# Patient Record
Sex: FEMALE | Race: BLACK OR AFRICAN AMERICAN | Employment: FULL TIME | URBAN - METROPOLITAN AREA
[De-identification: names, ages, dates, MRNs, and addresses within clinical notes are randomized per-mention and may not be internally consistent; named-entity substitution may affect disease eponyms.]

---

## 2017-05-17 ENCOUNTER — EMPLOYEE WELLNESS (OUTPATIENT)
Dept: OTHER | Age: 52
End: 2017-05-17

## 2017-05-17 LAB
CHOLESTEROL, TOTAL: 234 MG/DL (ref 0–199)
GLUCOSE BLD-MCNC: 91 MG/DL (ref 70–99)
HDLC SERPL-MCNC: 67 MG/DL (ref 40–60)
LDL CHOLESTEROL CALCULATED: 145 MG/DL
TRIGL SERPL-MCNC: 111 MG/DL (ref 0–150)

## 2017-11-01 ENCOUNTER — OFFICE VISIT (OUTPATIENT)
Dept: INTERNAL MEDICINE CLINIC | Age: 52
End: 2017-11-01

## 2017-11-01 VITALS
HEIGHT: 64 IN | HEART RATE: 88 BPM | RESPIRATION RATE: 12 BRPM | BODY MASS INDEX: 33.8 KG/M2 | DIASTOLIC BLOOD PRESSURE: 70 MMHG | WEIGHT: 198 LBS | OXYGEN SATURATION: 97 % | SYSTOLIC BLOOD PRESSURE: 110 MMHG | TEMPERATURE: 98 F

## 2017-11-01 DIAGNOSIS — Z12.11 COLON CANCER SCREENING: ICD-10-CM

## 2017-11-01 DIAGNOSIS — R73.03 PREDIABETES: ICD-10-CM

## 2017-11-01 DIAGNOSIS — Z11.59 NEED FOR HEPATITIS C SCREENING TEST: ICD-10-CM

## 2017-11-01 DIAGNOSIS — Z00.00 ANNUAL PHYSICAL EXAM: ICD-10-CM

## 2017-11-01 DIAGNOSIS — E78.5 HYPERLIPIDEMIA, UNSPECIFIED HYPERLIPIDEMIA TYPE: ICD-10-CM

## 2017-11-01 DIAGNOSIS — Z12.31 ENCOUNTER FOR SCREENING MAMMOGRAM FOR BREAST CANCER: ICD-10-CM

## 2017-11-01 DIAGNOSIS — Z00.00 ANNUAL PHYSICAL EXAM: Primary | ICD-10-CM

## 2017-11-01 LAB
A/G RATIO: 1.8 (ref 1.1–2.2)
ALBUMIN SERPL-MCNC: 4.8 G/DL (ref 3.4–5)
ALP BLD-CCNC: 85 U/L (ref 40–129)
ALT SERPL-CCNC: 23 U/L (ref 10–40)
ANION GAP SERPL CALCULATED.3IONS-SCNC: 14 MMOL/L (ref 3–16)
AST SERPL-CCNC: 21 U/L (ref 15–37)
BASOPHILS ABSOLUTE: 0.1 K/UL (ref 0–0.2)
BASOPHILS RELATIVE PERCENT: 1.4 %
BILIRUB SERPL-MCNC: 0.5 MG/DL (ref 0–1)
BILIRUBIN, POC: NORMAL
BLOOD URINE, POC: NORMAL
BUN BLDV-MCNC: 9 MG/DL (ref 7–20)
CALCIUM SERPL-MCNC: 10.1 MG/DL (ref 8.3–10.6)
CHLORIDE BLD-SCNC: 100 MMOL/L (ref 99–110)
CHOLESTEROL, TOTAL: 253 MG/DL (ref 0–199)
CLARITY, POC: NORMAL
CO2: 26 MMOL/L (ref 21–32)
COLOR, POC: NORMAL
CREAT SERPL-MCNC: 0.8 MG/DL (ref 0.6–1.1)
EOSINOPHILS ABSOLUTE: 0.2 K/UL (ref 0–0.6)
EOSINOPHILS RELATIVE PERCENT: 3.3 %
GFR AFRICAN AMERICAN: >60
GFR NON-AFRICAN AMERICAN: >60
GLOBULIN: 2.6 G/DL
GLUCOSE BLD-MCNC: 95 MG/DL (ref 70–99)
GLUCOSE URINE, POC: NORMAL
HCT VFR BLD CALC: 45.6 % (ref 36–48)
HDLC SERPL-MCNC: 70 MG/DL (ref 40–60)
HEMOGLOBIN: 15.2 G/DL (ref 12–16)
HEPATITIS C ANTIBODY INTERPRETATION: NORMAL
KETONES, POC: NORMAL
LDL CHOLESTEROL CALCULATED: 161 MG/DL
LEUKOCYTE EST, POC: NORMAL
LYMPHOCYTES ABSOLUTE: 1.9 K/UL (ref 1–5.1)
LYMPHOCYTES RELATIVE PERCENT: 39.7 %
MCH RBC QN AUTO: 29.6 PG (ref 26–34)
MCHC RBC AUTO-ENTMCNC: 33.3 G/DL (ref 31–36)
MCV RBC AUTO: 89 FL (ref 80–100)
MONOCYTES ABSOLUTE: 0.4 K/UL (ref 0–1.3)
MONOCYTES RELATIVE PERCENT: 9.2 %
NEUTROPHILS ABSOLUTE: 2.2 K/UL (ref 1.7–7.7)
NEUTROPHILS RELATIVE PERCENT: 46.4 %
NITRITE, POC: NORMAL
PDW BLD-RTO: 13.8 % (ref 12.4–15.4)
PH, POC: 7
PLATELET # BLD: 284 K/UL (ref 135–450)
PMV BLD AUTO: 9.8 FL (ref 5–10.5)
POTASSIUM SERPL-SCNC: 4.7 MMOL/L (ref 3.5–5.1)
PROTEIN, POC: NORMAL
RBC # BLD: 5.13 M/UL (ref 4–5.2)
SODIUM BLD-SCNC: 140 MMOL/L (ref 136–145)
SPECIFIC GRAVITY, POC: 1.02
TOTAL PROTEIN: 7.4 G/DL (ref 6.4–8.2)
TRIGL SERPL-MCNC: 112 MG/DL (ref 0–150)
TSH SERPL DL<=0.05 MIU/L-ACNC: 1.58 UIU/ML (ref 0.27–4.2)
UROBILINOGEN, POC: 0.2
VLDLC SERPL CALC-MCNC: 22 MG/DL
WBC # BLD: 4.8 K/UL (ref 4–11)

## 2017-11-01 PROCEDURE — 81002 URINALYSIS NONAUTO W/O SCOPE: CPT | Performed by: INTERNAL MEDICINE

## 2017-11-01 PROCEDURE — 99396 PREV VISIT EST AGE 40-64: CPT | Performed by: INTERNAL MEDICINE

## 2017-11-01 ASSESSMENT — ENCOUNTER SYMPTOMS
RECTAL PAIN: 0
ABDOMINAL DISTENTION: 0
APNEA: 0
BACK PAIN: 0
BLOOD IN STOOL: 0
SINUS PRESSURE: 0
DIARRHEA: 0
WHEEZING: 0
EYE DISCHARGE: 0
CONSTIPATION: 0
EYE ITCHING: 0
CHOKING: 0
EYE PAIN: 0
PHOTOPHOBIA: 0
RHINORRHEA: 0
FACIAL SWELLING: 0
CHEST TIGHTNESS: 0
SHORTNESS OF BREATH: 0
SORE THROAT: 0
NAUSEA: 0
ABDOMINAL PAIN: 0
EYE REDNESS: 0
TROUBLE SWALLOWING: 0
ANAL BLEEDING: 0
VOICE CHANGE: 0
COUGH: 0
VOMITING: 0

## 2017-11-01 ASSESSMENT — PATIENT HEALTH QUESTIONNAIRE - PHQ9
1. LITTLE INTEREST OR PLEASURE IN DOING THINGS: 1
SUM OF ALL RESPONSES TO PHQ QUESTIONS 1-9: 2
SUM OF ALL RESPONSES TO PHQ9 QUESTIONS 1 & 2: 2
2. FEELING DOWN, DEPRESSED OR HOPELESS: 1

## 2017-11-01 NOTE — PATIENT INSTRUCTIONS
-low carbohydrate diet  -Low fat, low cholesterol diet  -Regular aerobic exercise  -Have a screening mammogram done  -schedule a pap smear with OB/Gyn- Dr. Josh Alvarado, [address] [facility]. Phone: [phone]  -Complete FIT testing for colon cancer screening and return        Patient Education        Well Visit, Women 48 to 72: Care Instructions  Your Care Instructions  Physical exams can help you stay healthy. Your doctor has checked your overall health and may have suggested ways to take good care of yourself. He or she also may have recommended tests. At home, you can help prevent illness with healthy eating, regular exercise, and other steps. Follow-up care is a key part of your treatment and safety. Be sure to make and go to all appointments, and call your doctor if you are having problems. It's also a good idea to know your test results and keep a list of the medicines you take. How can you care for yourself at home? · Reach and stay at a healthy weight. This will lower your risk for many problems, such as obesity, diabetes, heart disease, and high blood pressure. · Get at least 30 minutes of exercise on most days of the week. Walking is a good choice. You also may want to do other activities, such as running, swimming, cycling, or playing tennis or team sports. · Do not smoke. Smoking can make health problems worse. If you need help quitting, talk to your doctor about stop-smoking programs and medicines. These can increase your chances of quitting for good. · Protect your skin from too much sun. When you're outdoors from 10 a.m. to 4 p.m., stay in the shade or cover up with clothing and a hat with a wide brim. Wear sunglasses that block UV rays. Even when it's cloudy, put broad-spectrum sunscreen (SPF 30 or higher) on any exposed skin. · See a dentist one or two times a year for checkups and to have your teeth cleaned. · Wear a seat belt in the car.   · Limit alcohol to 1 drink increase your risk for this disease. You may want to have this test before age 72. · Heart attack and stroke risk. At least every 4 to 6 years, you should have your risk for heart attack and stroke assessed. Your doctor uses factors such as your age, blood pressure, cholesterol, and whether you smoke or have diabetes to show what your risk for a heart attack or stroke is over the next 10 years. When should you call for help? Watch closely for changes in your health, and be sure to contact your doctor if you have any problems or symptoms that concern you. Where can you learn more? Go to [email or web address]://[email or web address].[email or web address]. org and sign in to your [employer] account. Enter Y146 in the [employer] box to learn more about \"Well Visit, Women 50 to 72: Care Instructions. \"     If you do not have an account, please click on the \"Sign Up Now\" link. Current as of: July 19, 2016  Content Version: 11.3  © 2725-9715 [employer]. Care instructions adapted under license by [facility] ([facility]). If you have questions about a medical condition or this instruction, always ask your healthcare professional. [place] [address] any warranty or liability for your use of this information.

## 2017-11-02 PROBLEM — Z00.00 ANNUAL PHYSICAL EXAM: Status: ACTIVE | Noted: 2017-11-02

## 2017-11-02 LAB
ESTIMATED AVERAGE GLUCOSE: 125.5 MG/DL
HBA1C MFR BLD: 6 %

## 2018-01-12 ENCOUNTER — TELEPHONE (OUTPATIENT)
Dept: INTERNAL MEDICINE CLINIC | Age: 53
End: 2018-01-12

## 2018-02-06 ENCOUNTER — OFFICE VISIT (OUTPATIENT)
Dept: INTERNAL MEDICINE CLINIC | Age: 53
End: 2018-02-06

## 2018-02-06 VITALS
DIASTOLIC BLOOD PRESSURE: 90 MMHG | OXYGEN SATURATION: 97 % | SYSTOLIC BLOOD PRESSURE: 138 MMHG | HEIGHT: 64 IN | BODY MASS INDEX: 34.76 KG/M2 | HEART RATE: 68 BPM | WEIGHT: 203.6 LBS

## 2018-02-06 DIAGNOSIS — H53.8 BLURRED VISION, BILATERAL: ICD-10-CM

## 2018-02-06 DIAGNOSIS — R05.9 COUGH: ICD-10-CM

## 2018-02-06 DIAGNOSIS — R73.03 PREDIABETES: ICD-10-CM

## 2018-02-06 DIAGNOSIS — R03.0 ELEVATED BLOOD PRESSURE READING: ICD-10-CM

## 2018-02-06 DIAGNOSIS — R42 DIZZINESS: Primary | ICD-10-CM

## 2018-02-06 LAB
GLUCOSE BLD-MCNC: 171 MG/DL
HBA1C MFR BLD: 5.9 %

## 2018-02-06 PROCEDURE — 82962 GLUCOSE BLOOD TEST: CPT | Performed by: NURSE PRACTITIONER

## 2018-02-06 PROCEDURE — 99214 OFFICE O/P EST MOD 30 MIN: CPT | Performed by: NURSE PRACTITIONER

## 2018-02-06 PROCEDURE — 83036 HEMOGLOBIN GLYCOSYLATED A1C: CPT | Performed by: NURSE PRACTITIONER

## 2018-02-06 ASSESSMENT — PATIENT HEALTH QUESTIONNAIRE - PHQ9
1. LITTLE INTEREST OR PLEASURE IN DOING THINGS: 0
SUM OF ALL RESPONSES TO PHQ QUESTIONS 1-9: 0
2. FEELING DOWN, DEPRESSED OR HOPELESS: 0
SUM OF ALL RESPONSES TO PHQ9 QUESTIONS 1 & 2: 0

## 2018-02-06 ASSESSMENT — ENCOUNTER SYMPTOMS
SINUS PRESSURE: 0
PHOTOPHOBIA: 0
SINUS PAIN: 0

## 2018-02-06 NOTE — PATIENT INSTRUCTIONS
water. Swallowing the water will increase the chance that the medicine will get into your bloodstream. This may make it more likely that you will have side effects. To use a spacer with an inhaler  1. Shake the inhaler. Remove the inhaler cap, and place the mouthpiece of the inhaler into the spacer. Check the inhaler instructions to see if you need to prime your inhaler before you use it. If it needs priming, follow the instructions on how to prime your inhaler. 2. Remove the cap from the spacer. 3. Hold the inhaler upright with the mouthpiece at the bottom. 4. Tilt your head back a little, and breathe out slowly and completely. 5. Place the spacer's mouthpiece in your mouth. 6. Press down on the inhaler to spray one puff of medicine into the spacer, and then start breathing in slowly. Wait to inhale until after you have pressed down on the inhaler. Some spacers have a whistle. If you hear it, you should breathe in more slowly. 7. Hold your breath for 10 seconds. This will let the medicine settle in your lungs. 8. If you need to take a second dose, wait 30 to 60 seconds to allow the inhaler valve to refill. To use an inhaler without a spacer  1. Shake the inhaler as directed. Remove the cap. Check the instructions to see if you need to prime your inhaler before you use it. If it needs priming, follow the instructions on how to prime your inhaler. 2. Hold the inhaler upright with the mouthpiece at the bottom. 3. Tilt your head back a little, and breathe out slowly and completely. 4. Position the inhaler in one of two ways:  ¨ You can place the inhaler in your mouth. This is easier for most people. And it lowers the risk that any of the medicine will get into your eyes. ¨ Or you can place the inhaler 1 to 2 inches in front of your open mouth, without closing your lips over it. Try to open your mouth as wide as you can.  Placing the inhaler in front of your open mouth may be better for getting the medicine

## 2018-02-06 NOTE — PROGRESS NOTES
side.       Patellar reflexes are 2+ on the right side and 2+ on the left side. Achilles reflexes are 2+ on the right side and 2+ on the left side. Skin: Skin is warm and dry. No rash noted. Psychiatric: She has a normal mood and affect. Her behavior is normal. Judgment and thought content normal.   Nursing note and vitals reviewed. Assessment/Plan     Results for POC orders placed in visit on 02/06/18   POCT glycosylated hemoglobin (Hb A1C)   Result Value Ref Range    Hemoglobin A1C 5.9 %   POCT Glucose   Result Value Ref Range    Glucose 171 mg/dL       1. Dizziness  New  Resolved for now. May be related to low blood glucose; elevated blood pressure. Watch your blood pressures. Please remember to keep track of your blood pressure. Ideally, the top number (systolic) should be under 130 and the bottom number (diastolic) should be under 80. If the top is consistently over 140 or the bottom over 90, you should start a blood pressure medication to control it. Watch the salt in your diet -- too much can make blood pressure worse. You should have a doctor or nurse check your blood pressure at least once a year. Try to eat small frequent meals throughout the day to avoid drops in blood sugar. Stop the Delsym as this has sugar in it.    - POCT Glucose  - POCT glycosylated hemoglobin (Hb A1C)    2. Blurred vision, bilateral  Please have your eyes checked and make sure to ask them to check your eye pressures. 3. Prediabetes  Stable    4. Cough  May be bronchial.  Albuterol inhaler when you are coughing or feeling tight in the chest.  Try OTC antihistamine such as Claritin, Allegra or Zyrtec. Trial one of the following nasal steroids:      Nasacort AQ or Flonase 2 sprays each side of the nose once daily. Both are available over the counter. Consider CXR if sxs persist.     5. Elevated blood pressure reading  Up today. Watch this as noted above.       Follow up in 4 to 6 weeks of your symptoms return. Otherwise, follow up as needed. Discussed medications with patient, who voiced understanding of their use and indications. All questions answered. Pt is advised to call if symptoms worsen or do not improve.

## 2018-02-07 ASSESSMENT — ENCOUNTER SYMPTOMS
WHEEZING: 1
CHEST TIGHTNESS: 0
SHORTNESS OF BREATH: 0
GASTROINTESTINAL NEGATIVE: 1
COUGH: 1

## 2018-03-20 VITALS — WEIGHT: 199 LBS | BODY MASS INDEX: 35.25 KG/M2

## 2018-04-12 PROBLEM — Z00.00 ANNUAL PHYSICAL EXAM: Status: RESOLVED | Noted: 2017-11-02 | Resolved: 2018-04-12

## 2018-05-10 ENCOUNTER — EMPLOYEE WELLNESS (OUTPATIENT)
Dept: OTHER | Age: 53
End: 2018-05-10

## 2018-05-21 VITALS — WEIGHT: 203 LBS | BODY MASS INDEX: 34.84 KG/M2

## 2018-07-23 ENCOUNTER — HOSPITAL ENCOUNTER (OUTPATIENT)
Dept: MAMMOGRAPHY | Age: 53
Discharge: HOME | End: 2018-07-23
Attending: INTERNAL MEDICINE | Admitting: INTERNAL MEDICINE

## 2018-07-23 DIAGNOSIS — Z12.31 ENCOUNTER FOR SCREENING MAMMOGRAM FOR BREAST CANCER: ICD-10-CM

## 2019-07-01 ENCOUNTER — OFFICE VISIT (OUTPATIENT)
Dept: INTERNAL MEDICINE CLINIC | Age: 54
End: 2019-07-01
Payer: COMMERCIAL

## 2019-07-01 VITALS
TEMPERATURE: 98.4 F | HEIGHT: 64 IN | DIASTOLIC BLOOD PRESSURE: 82 MMHG | WEIGHT: 208 LBS | HEART RATE: 75 BPM | SYSTOLIC BLOOD PRESSURE: 134 MMHG | OXYGEN SATURATION: 99 % | BODY MASS INDEX: 35.51 KG/M2 | RESPIRATION RATE: 14 BRPM

## 2019-07-01 DIAGNOSIS — R20.0 BILATERAL HAND NUMBNESS: ICD-10-CM

## 2019-07-01 DIAGNOSIS — R73.03 PREDIABETES: ICD-10-CM

## 2019-07-01 DIAGNOSIS — M79.641 BILATERAL HAND PAIN: ICD-10-CM

## 2019-07-01 DIAGNOSIS — L29.9 ITCHING: ICD-10-CM

## 2019-07-01 DIAGNOSIS — Z12.31 ENCOUNTER FOR SCREENING MAMMOGRAM FOR BREAST CANCER: ICD-10-CM

## 2019-07-01 DIAGNOSIS — Z00.00 ANNUAL PHYSICAL EXAM: Primary | ICD-10-CM

## 2019-07-01 DIAGNOSIS — E78.2 MIXED HYPERLIPIDEMIA: ICD-10-CM

## 2019-07-01 DIAGNOSIS — Z12.4 PAP SMEAR FOR CERVICAL CANCER SCREENING: ICD-10-CM

## 2019-07-01 DIAGNOSIS — M79.642 BILATERAL HAND PAIN: ICD-10-CM

## 2019-07-01 DIAGNOSIS — Z00.00 ANNUAL PHYSICAL EXAM: ICD-10-CM

## 2019-07-01 LAB
A/G RATIO: 1.8 (ref 1.1–2.2)
ALBUMIN SERPL-MCNC: 4.7 G/DL (ref 3.4–5)
ALP BLD-CCNC: 85 U/L (ref 40–129)
ALT SERPL-CCNC: 25 U/L (ref 10–40)
ANION GAP SERPL CALCULATED.3IONS-SCNC: 16 MMOL/L (ref 3–16)
AST SERPL-CCNC: 20 U/L (ref 15–37)
BASOPHILS ABSOLUTE: 0 K/UL (ref 0–0.2)
BASOPHILS RELATIVE PERCENT: 0.9 %
BILIRUB SERPL-MCNC: 0.4 MG/DL (ref 0–1)
BUN BLDV-MCNC: 10 MG/DL (ref 7–20)
CALCIUM SERPL-MCNC: 10.2 MG/DL (ref 8.3–10.6)
CHLORIDE BLD-SCNC: 103 MMOL/L (ref 99–110)
CHOLESTEROL, TOTAL: 246 MG/DL (ref 0–199)
CO2: 24 MMOL/L (ref 21–32)
CREAT SERPL-MCNC: 1.1 MG/DL (ref 0.6–1.1)
EOSINOPHILS ABSOLUTE: 0.7 K/UL (ref 0–0.6)
EOSINOPHILS RELATIVE PERCENT: 14.8 %
GFR AFRICAN AMERICAN: >60
GFR NON-AFRICAN AMERICAN: 51
GLOBULIN: 2.6 G/DL
GLUCOSE BLD-MCNC: 110 MG/DL (ref 70–99)
HCT VFR BLD CALC: 43 % (ref 36–48)
HDLC SERPL-MCNC: 65 MG/DL (ref 40–60)
HEMOGLOBIN: 14.2 G/DL (ref 12–16)
LDL CHOLESTEROL CALCULATED: 161 MG/DL
LYMPHOCYTES ABSOLUTE: 1.9 K/UL (ref 1–5.1)
LYMPHOCYTES RELATIVE PERCENT: 40.6 %
MCH RBC QN AUTO: 29.1 PG (ref 26–34)
MCHC RBC AUTO-ENTMCNC: 33.1 G/DL (ref 31–36)
MCV RBC AUTO: 87.7 FL (ref 80–100)
MONOCYTES ABSOLUTE: 0.4 K/UL (ref 0–1.3)
MONOCYTES RELATIVE PERCENT: 8.3 %
NEUTROPHILS ABSOLUTE: 1.6 K/UL (ref 1.7–7.7)
NEUTROPHILS RELATIVE PERCENT: 35.4 %
PDW BLD-RTO: 13.7 % (ref 12.4–15.4)
PLATELET # BLD: 256 K/UL (ref 135–450)
PMV BLD AUTO: 9.5 FL (ref 5–10.5)
POTASSIUM SERPL-SCNC: 4.7 MMOL/L (ref 3.5–5.1)
RBC # BLD: 4.9 M/UL (ref 4–5.2)
SODIUM BLD-SCNC: 143 MMOL/L (ref 136–145)
TOTAL PROTEIN: 7.3 G/DL (ref 6.4–8.2)
TRIGL SERPL-MCNC: 99 MG/DL (ref 0–150)
TSH SERPL DL<=0.05 MIU/L-ACNC: 1.4 UIU/ML (ref 0.27–4.2)
VLDLC SERPL CALC-MCNC: 20 MG/DL
WBC # BLD: 4.6 K/UL (ref 4–11)

## 2019-07-01 PROCEDURE — 99396 PREV VISIT EST AGE 40-64: CPT | Performed by: INTERNAL MEDICINE

## 2019-07-01 ASSESSMENT — ENCOUNTER SYMPTOMS
VOICE CHANGE: 0
EYE ITCHING: 0
ABDOMINAL DISTENTION: 0
ABDOMINAL PAIN: 0
SORE THROAT: 0
BLOOD IN STOOL: 0
CHOKING: 0
APNEA: 0
EYE DISCHARGE: 0
RHINORRHEA: 0
PHOTOPHOBIA: 0
VOMITING: 0
CHEST TIGHTNESS: 0
EYE PAIN: 0
CONSTIPATION: 0
WHEEZING: 0
RECTAL PAIN: 0
NAUSEA: 0
TROUBLE SWALLOWING: 0
SINUS PRESSURE: 0
ANAL BLEEDING: 0
EYE REDNESS: 0
COUGH: 0
BACK PAIN: 0
DIARRHEA: 0
SHORTNESS OF BREATH: 0
FACIAL SWELLING: 0

## 2019-07-01 ASSESSMENT — PATIENT HEALTH QUESTIONNAIRE - PHQ9
1. LITTLE INTEREST OR PLEASURE IN DOING THINGS: 0
SUM OF ALL RESPONSES TO PHQ QUESTIONS 1-9: 0
SUM OF ALL RESPONSES TO PHQ QUESTIONS 1-9: 0
2. FEELING DOWN, DEPRESSED OR HOPELESS: 0
SUM OF ALL RESPONSES TO PHQ9 QUESTIONS 1 & 2: 0

## 2019-07-01 NOTE — PROGRESS NOTES
History   Administered Date(s) Administered    Influenza Vaccine, unspecified formulation 10/06/2017    Influenza Virus Vaccine 11/11/2015    Tdap (Boostrix, Adacel) 06/11/2010, 07/23/2013       Vitals:    07/01/19 1304   BP: 134/82   Site: Right Upper Arm   Position: Sitting   Cuff Size: Medium Adult   Pulse: 75   Resp: 14   Temp: 98.4 °F (36.9 °C)   TempSrc: Oral   SpO2: 99%   Weight: 208 lb (94.3 kg)   Height: 5' 4\" (1.626 m)     Body mass index is 35.7 kg/m². Physical Exam   Constitutional: She is oriented to person, place, and time. She appears well-developed and well-nourished. No distress. HENT:   Head: Normocephalic and atraumatic. Right Ear: Hearing, tympanic membrane and ear canal normal.   Left Ear: Hearing, tympanic membrane and ear canal normal.   Nose: Nose normal.   Mouth/Throat: Uvula is midline, oropharynx is clear and moist and mucous membranes are normal.   Eyes: Pupils are equal, round, and reactive to light. Conjunctivae, EOM and lids are normal.   Neck: Neck supple. Carotid bruit is not present. No thyromegaly present. Cardiovascular: Normal rate, regular rhythm, S1 normal, S2 normal, normal heart sounds and intact distal pulses. Exam reveals no gallop and no friction rub. No murmur heard. Pulmonary/Chest: Effort normal and breath sounds normal. No respiratory distress. She has no wheezes. She has no rhonchi. She has no rales. Right breast exhibits no inverted nipple, no mass, no nipple discharge, no skin change and no tenderness. Left breast exhibits no inverted nipple, no mass, no nipple discharge, no skin change and no tenderness. Breasts are symmetrical.   Abdominal: Soft. Bowel sounds are normal. She exhibits no distension and no mass. There is no hepatosplenomegaly. There is no tenderness. There is no rebound. Genitourinary: Vagina normal and uterus normal. There is no lesion on the right labia. There is no lesion on the left labia.  Cervix exhibits no motion tenderness,

## 2019-07-01 NOTE — PATIENT INSTRUCTIONS
1. Annual physical exam  - Comprehensive Metabolic Panel; Future  - CBC Auto Differential; Future  - Lipid Panel; Future  - TSH without Reflex; Future  - Patient given order for screening mammogram due 7/23/19  - pap smear done today  - Tdap done on 7/23/13  -Regular aerobic exercise    2. Prediabetes  -Low carbohydrate diet  -Regular aerobic exercise  - Hemoglobin A1C; Future    3. Mixed hyperlipidemia  -Low fat, low cholesterol diet  -Regular aerobic exercise    4. Bilateral hand numbness  - EMG; Future  - wrist supports nightly    5. Bilateral hand pain  - XR HAND LEFT (MIN 3 VIEWS); Future  - XR HAND RIGHT (MIN 3 VIEWS); Future  - Ibuprofen as needed    6. Itching  - hydrocortisone 2.5 % cream; Apply topically 2 times daily. Dispense: 30 g; Refill: 0    7. Encounter for screening mammogram for breast cancer  - CHLOE DIGITAL SCREEN W OR WO CAD BILATERAL; Future        Patient Education        Well Visit, Women 48 to 72: Care Instructions  Your Care Instructions    Physical exams can help you stay healthy. Your doctor has checked your overall health and may have suggested ways to take good care of yourself. He or she also may have recommended tests. At home, you can help prevent illness with healthy eating, regular exercise, and other steps. Follow-up care is a key part of your treatment and safety. Be sure to make and go to all appointments, and call your doctor if you are having problems. It's also a good idea to know your test results and keep a list of the medicines you take. How can you care for yourself at home? · Reach and stay at a healthy weight. This will lower your risk for many problems, such as obesity, diabetes, heart disease, and high blood pressure. · Get at least 30 minutes of exercise on most days of the week. Walking is a good choice. You also may want to do other activities, such as running, swimming, cycling, or playing tennis or team sports. · Do not smoke.  Smoking can make health

## 2019-07-02 LAB
ESTIMATED AVERAGE GLUCOSE: 128.4 MG/DL
HBA1C MFR BLD: 6.1 %

## 2019-07-08 ASSESSMENT — ENCOUNTER SYMPTOMS: ROS SKIN COMMENTS: ITCHING

## 2019-07-12 ENCOUNTER — TELEPHONE (OUTPATIENT)
Dept: INTERNAL MEDICINE CLINIC | Age: 54
End: 2019-07-12

## 2019-07-27 ENCOUNTER — HOSPITAL ENCOUNTER (OUTPATIENT)
Dept: WOMENS IMAGING | Age: 54
Discharge: HOME | End: 2019-07-27
Payer: COMMERCIAL

## 2019-07-27 ENCOUNTER — HOSPITAL ENCOUNTER (OUTPATIENT)
Dept: GENERAL RADIOLOGY | Age: 54
Discharge: HOME | End: 2019-07-27
Payer: COMMERCIAL

## 2019-07-27 ENCOUNTER — HOSPITAL ENCOUNTER (OUTPATIENT)
Age: 54
Discharge: HOME | End: 2019-07-27
Payer: COMMERCIAL

## 2019-07-27 DIAGNOSIS — M79.642 BILATERAL HAND PAIN: ICD-10-CM

## 2019-07-27 DIAGNOSIS — Z12.31 ENCOUNTER FOR SCREENING MAMMOGRAM FOR BREAST CANCER: ICD-10-CM

## 2019-07-27 DIAGNOSIS — M79.641 BILATERAL HAND PAIN: ICD-10-CM

## 2019-07-27 PROCEDURE — 73130 X-RAY EXAM OF HAND: CPT

## 2019-07-27 PROCEDURE — 77067 SCR MAMMO BI INCL CAD: CPT

## 2019-08-01 ENCOUNTER — OFFICE VISIT (OUTPATIENT)
Dept: INTERNAL MEDICINE CLINIC | Age: 54
End: 2019-08-01
Payer: COMMERCIAL

## 2019-08-01 VITALS
DIASTOLIC BLOOD PRESSURE: 88 MMHG | WEIGHT: 206.4 LBS | BODY MASS INDEX: 35.24 KG/M2 | HEIGHT: 64 IN | HEART RATE: 83 BPM | OXYGEN SATURATION: 95 % | SYSTOLIC BLOOD PRESSURE: 120 MMHG

## 2019-08-01 DIAGNOSIS — R87.618 UNEXPLAINED ENDOMETRIAL CELLS ON CERVICAL PAP SMEAR: ICD-10-CM

## 2019-08-01 DIAGNOSIS — M79.642 BILATERAL HAND PAIN: ICD-10-CM

## 2019-08-01 DIAGNOSIS — D70.9 NEUTROPENIA, UNSPECIFIED TYPE (HCC): ICD-10-CM

## 2019-08-01 DIAGNOSIS — M19.042 PRIMARY OSTEOARTHRITIS OF LEFT HAND: Primary | ICD-10-CM

## 2019-08-01 DIAGNOSIS — M79.641 BILATERAL HAND PAIN: ICD-10-CM

## 2019-08-01 DIAGNOSIS — L29.9 ITCHING: ICD-10-CM

## 2019-08-01 PROCEDURE — 99214 OFFICE O/P EST MOD 30 MIN: CPT | Performed by: INTERNAL MEDICINE

## 2019-08-01 NOTE — PATIENT INSTRUCTIONS
-Ibuprofen 600mg 3 times daily as needed  -Have EMG done  -Referral to Gynecology for unexplained endometrial cells on pap smear  -low carbohydrate diet  -Low fat, low cholesterol diet  -Regular aerobic exercise

## 2019-08-07 ASSESSMENT — ENCOUNTER SYMPTOMS
COUGH: 0
CHEST TIGHTNESS: 0
SINUS PRESSURE: 0
CONSTIPATION: 0
RHINORRHEA: 0
DIARRHEA: 0
SORE THROAT: 0
VOMITING: 0
BLOOD IN STOOL: 0
NAUSEA: 0
SHORTNESS OF BREATH: 0
WHEEZING: 0
ABDOMINAL PAIN: 0

## 2019-08-20 ENCOUNTER — HOSPITAL ENCOUNTER (OUTPATIENT)
Dept: NEUROLOGY | Age: 54
Discharge: HOME | End: 2019-08-20
Payer: COMMERCIAL

## 2019-08-20 DIAGNOSIS — R20.0 BILATERAL HAND NUMBNESS: ICD-10-CM

## 2019-08-20 PROCEDURE — 95886 MUSC TEST DONE W/N TEST COMP: CPT

## 2019-08-20 PROCEDURE — 95911 NRV CNDJ TEST 9-10 STUDIES: CPT | Performed by: PSYCHIATRY & NEUROLOGY

## 2019-08-20 PROCEDURE — 95911 NRV CNDJ TEST 9-10 STUDIES: CPT

## 2019-08-20 PROCEDURE — 95886 MUSC TEST DONE W/N TEST COMP: CPT | Performed by: PSYCHIATRY & NEUROLOGY

## 2019-09-06 DIAGNOSIS — G56.03 CARPAL TUNNEL SYNDROME, BILATERAL: Primary | ICD-10-CM

## 2019-10-09 ENCOUNTER — OFFICE VISIT (OUTPATIENT)
Dept: ORTHOPEDIC SURGERY | Age: 54
End: 2019-10-09
Payer: COMMERCIAL

## 2019-10-09 VITALS
DIASTOLIC BLOOD PRESSURE: 83 MMHG | WEIGHT: 190 LBS | HEART RATE: 68 BPM | RESPIRATION RATE: 8 BRPM | HEIGHT: 62 IN | BODY MASS INDEX: 34.96 KG/M2 | SYSTOLIC BLOOD PRESSURE: 134 MMHG

## 2019-10-09 DIAGNOSIS — G56.03 BILATERAL CARPAL TUNNEL SYNDROME: Primary | ICD-10-CM

## 2019-10-09 DIAGNOSIS — R20.0 BILATERAL HAND NUMBNESS: ICD-10-CM

## 2019-10-09 PROCEDURE — 99203 OFFICE O/P NEW LOW 30 MIN: CPT | Performed by: PHYSICIAN ASSISTANT

## 2019-10-09 PROCEDURE — L3908 WHO COCK-UP NONMOLDE PRE OTS: HCPCS | Performed by: PHYSICIAN ASSISTANT

## 2019-10-19 ENCOUNTER — APPOINTMENT (OUTPATIENT)
Dept: GENERAL RADIOLOGY | Age: 54
End: 2019-10-19
Payer: COMMERCIAL

## 2019-10-19 ENCOUNTER — HOSPITAL ENCOUNTER (EMERGENCY)
Age: 54
Discharge: HOME | End: 2019-10-19
Payer: COMMERCIAL

## 2019-10-19 VITALS
RESPIRATION RATE: 16 BRPM | TEMPERATURE: 97.6 F | OXYGEN SATURATION: 98 % | HEART RATE: 68 BPM | BODY MASS INDEX: 34.96 KG/M2 | SYSTOLIC BLOOD PRESSURE: 146 MMHG | WEIGHT: 190 LBS | DIASTOLIC BLOOD PRESSURE: 92 MMHG | HEIGHT: 62 IN

## 2019-10-19 DIAGNOSIS — S63.92XA HAND SPRAIN, LEFT, INITIAL ENCOUNTER: Primary | ICD-10-CM

## 2019-10-19 PROCEDURE — 73130 X-RAY EXAM OF HAND: CPT

## 2019-10-19 PROCEDURE — 99283 EMERGENCY DEPT VISIT LOW MDM: CPT

## 2019-10-19 PROCEDURE — 73110 X-RAY EXAM OF WRIST: CPT

## 2019-10-19 RX ORDER — NAPROXEN 500 MG/1
500 TABLET ORAL 2 TIMES DAILY
Qty: 20 TABLET | Refills: 0 | Status: SHIPPED | OUTPATIENT
Start: 2019-10-19 | End: 2019-10-29

## 2019-10-19 ASSESSMENT — ENCOUNTER SYMPTOMS
COLOR CHANGE: 0
SHORTNESS OF BREATH: 0
COUGH: 0
STRIDOR: 0
BACK PAIN: 0
WHEEZING: 0

## 2019-10-19 ASSESSMENT — PAIN DESCRIPTION - PAIN TYPE: TYPE: ACUTE PAIN

## 2019-10-19 ASSESSMENT — PAIN DESCRIPTION - ORIENTATION: ORIENTATION: LEFT

## 2019-10-19 ASSESSMENT — PAIN DESCRIPTION - LOCATION: LOCATION: HAND;WRIST

## 2019-10-19 ASSESSMENT — PAIN SCALES - GENERAL: PAINLEVEL_OUTOF10: 7

## 2019-10-22 ENCOUNTER — OFFICE VISIT (OUTPATIENT)
Dept: PRIMARY CARE CLINIC | Age: 54
End: 2019-10-22
Payer: COMMERCIAL

## 2019-10-22 VITALS
HEART RATE: 78 BPM | SYSTOLIC BLOOD PRESSURE: 120 MMHG | BODY MASS INDEX: 36.18 KG/M2 | RESPIRATION RATE: 16 BRPM | TEMPERATURE: 98.2 F | WEIGHT: 196.6 LBS | HEIGHT: 62 IN | DIASTOLIC BLOOD PRESSURE: 82 MMHG | OXYGEN SATURATION: 98 %

## 2019-10-22 DIAGNOSIS — S63.92XA SPRAIN OF LEFT HAND, INITIAL ENCOUNTER: ICD-10-CM

## 2019-10-22 DIAGNOSIS — G56.03 BILATERAL CARPAL TUNNEL SYNDROME: Primary | ICD-10-CM

## 2019-10-22 PROCEDURE — 99213 OFFICE O/P EST LOW 20 MIN: CPT | Performed by: INTERNAL MEDICINE

## 2019-10-22 ASSESSMENT — PATIENT HEALTH QUESTIONNAIRE - PHQ9
SUM OF ALL RESPONSES TO PHQ QUESTIONS 1-9: 0
2. FEELING DOWN, DEPRESSED OR HOPELESS: 0
SUM OF ALL RESPONSES TO PHQ9 QUESTIONS 1 & 2: 0
1. LITTLE INTEREST OR PLEASURE IN DOING THINGS: 0
SUM OF ALL RESPONSES TO PHQ QUESTIONS 1-9: 0

## 2019-10-29 ENCOUNTER — TELEPHONE (OUTPATIENT)
Dept: ORTHOPEDIC SURGERY | Age: 54
End: 2019-10-29

## 2020-02-03 ENCOUNTER — OFFICE VISIT (OUTPATIENT)
Dept: PRIMARY CARE CLINIC | Age: 55
End: 2020-02-03
Payer: COMMERCIAL

## 2020-02-03 VITALS
HEART RATE: 82 BPM | OXYGEN SATURATION: 96 % | HEIGHT: 62 IN | SYSTOLIC BLOOD PRESSURE: 132 MMHG | BODY MASS INDEX: 34.34 KG/M2 | DIASTOLIC BLOOD PRESSURE: 80 MMHG | WEIGHT: 186.6 LBS

## 2020-02-03 DIAGNOSIS — R73.03 PREDIABETES: ICD-10-CM

## 2020-02-03 DIAGNOSIS — E78.2 MIXED HYPERLIPIDEMIA: ICD-10-CM

## 2020-02-03 DIAGNOSIS — D70.9 NEUTROPENIA, UNSPECIFIED TYPE (HCC): ICD-10-CM

## 2020-02-03 LAB
BASOPHILS ABSOLUTE: 0.1 K/UL (ref 0–0.2)
BASOPHILS RELATIVE PERCENT: 1.7 %
EOSINOPHILS ABSOLUTE: 0.1 K/UL (ref 0–0.6)
EOSINOPHILS RELATIVE PERCENT: 3 %
HCT VFR BLD CALC: 44.3 % (ref 36–48)
HEMOGLOBIN: 14.6 G/DL (ref 12–16)
LYMPHOCYTES ABSOLUTE: 2 K/UL (ref 1–5.1)
LYMPHOCYTES RELATIVE PERCENT: 46 %
MCH RBC QN AUTO: 29.3 PG (ref 26–34)
MCHC RBC AUTO-ENTMCNC: 32.9 G/DL (ref 31–36)
MCV RBC AUTO: 88.9 FL (ref 80–100)
MONOCYTES ABSOLUTE: 0.6 K/UL (ref 0–1.3)
MONOCYTES RELATIVE PERCENT: 12.9 %
NEUTROPHILS ABSOLUTE: 1.6 K/UL (ref 1.7–7.7)
NEUTROPHILS RELATIVE PERCENT: 36.4 %
PDW BLD-RTO: 13.3 % (ref 12.4–15.4)
PLATELET # BLD: 242 K/UL (ref 135–450)
PMV BLD AUTO: 10.1 FL (ref 5–10.5)
RBC # BLD: 4.99 M/UL (ref 4–5.2)
WBC # BLD: 4.4 K/UL (ref 4–11)

## 2020-02-03 PROCEDURE — 99214 OFFICE O/P EST MOD 30 MIN: CPT | Performed by: INTERNAL MEDICINE

## 2020-02-03 SDOH — ECONOMIC STABILITY: FOOD INSECURITY: WITHIN THE PAST 12 MONTHS, THE FOOD YOU BOUGHT JUST DIDN'T LAST AND YOU DIDN'T HAVE MONEY TO GET MORE.: NEVER TRUE

## 2020-02-03 SDOH — ECONOMIC STABILITY: INCOME INSECURITY: HOW HARD IS IT FOR YOU TO PAY FOR THE VERY BASICS LIKE FOOD, HOUSING, MEDICAL CARE, AND HEATING?: NOT VERY HARD

## 2020-02-03 SDOH — ECONOMIC STABILITY: TRANSPORTATION INSECURITY
IN THE PAST 12 MONTHS, HAS THE LACK OF TRANSPORTATION KEPT YOU FROM MEDICAL APPOINTMENTS OR FROM GETTING MEDICATIONS?: NO

## 2020-02-03 SDOH — ECONOMIC STABILITY: FOOD INSECURITY: WITHIN THE PAST 12 MONTHS, YOU WORRIED THAT YOUR FOOD WOULD RUN OUT BEFORE YOU GOT MONEY TO BUY MORE.: NEVER TRUE

## 2020-02-03 SDOH — ECONOMIC STABILITY: TRANSPORTATION INSECURITY
IN THE PAST 12 MONTHS, HAS LACK OF TRANSPORTATION KEPT YOU FROM MEETINGS, WORK, OR FROM GETTING THINGS NEEDED FOR DAILY LIVING?: NO

## 2020-02-03 ASSESSMENT — PATIENT HEALTH QUESTIONNAIRE - PHQ9
SUM OF ALL RESPONSES TO PHQ9 QUESTIONS 1 & 2: 0
2. FEELING DOWN, DEPRESSED OR HOPELESS: 0
SUM OF ALL RESPONSES TO PHQ QUESTIONS 1-9: 0
SUM OF ALL RESPONSES TO PHQ QUESTIONS 1-9: 0
1. LITTLE INTEREST OR PLEASURE IN DOING THINGS: 0

## 2020-02-03 ASSESSMENT — ENCOUNTER SYMPTOMS
RHINORRHEA: 0
SINUS PRESSURE: 0
NAUSEA: 0
CHEST TIGHTNESS: 0
WHEEZING: 0
BLOOD IN STOOL: 0
VOMITING: 0
ABDOMINAL PAIN: 0
SORE THROAT: 0
CONSTIPATION: 0
SHORTNESS OF BREATH: 0
COUGH: 0
DIARRHEA: 0

## 2020-02-03 NOTE — PATIENT INSTRUCTIONS
1. Mixed hyperlipidemia  - patient is not on medications  - patient has weight loss of 20 lbs  -Low fat, low cholesterol diet  -Regular aerobic exercise  - Lipid Panel; Future    2. Prediabetes  -Low carbohydrate diet  -Pt states she has been exercising 5 days per week. Pt does treadmill for 30-40 minutes and weights.  -Regular aerobic exercise  -Hemoglobin A1C; Future    3.  Primary osteoarthritis of left hand  -stable  -Continue same medications

## 2020-02-03 NOTE — PROGRESS NOTES
Gianna Briscoe   Date ofBirth:  1965    Date of Visit:  2/3/2020    Chief Complaint   Patient presents with    Hyperlipidemia    Arthritis     hand    Other     Prediabetes       HPI  Hyperlipidemia-Pt is not on cholesterol medication. Pt states she has been decreasing fat and cholesterol. Pt states she has been exercising 5 days per week. Pt does treadmill for 30-40 minutes and weights. Pt is fasting today for labs. Prediabetes- Pt decreases carbohydrates. Pt states she has been exercising 5 days per week. Pt does treadmill for 30-40 minutes and weights. Pt states she has lost close to 20 lbs since July. Primary osteoarthritis left hand- Pt states her hand pain is daily but not all day. Hand pain is dull. Pt takes Ibuprofen sometimes. Wt Readings from Last 3 Encounters:   02/03/20 186 lb 9.6 oz (84.6 kg)   10/22/19 196 lb 9.6 oz (89.2 kg)   10/19/19 190 lb (86.2 kg)          Review of Systems   Constitutional: Negative for chills, fatigue and fever. HENT: Negative for congestion, postnasal drip, rhinorrhea, sinus pressure and sore throat. Eyes: Negative for visual disturbance. Respiratory: Negative for cough, chest tightness, shortness of breath and wheezing. Cardiovascular: Negative for chest pain, palpitations and leg swelling. Gastrointestinal: Negative for abdominal pain, blood in stool, constipation, diarrhea, nausea and vomiting. Genitourinary: Negative for dysuria, frequency and hematuria. Musculoskeletal: Positive for arthralgias. Negative for joint swelling and myalgias. Skin: Negative for rash. Neurological: Positive for numbness (in hands due to carpal tunnel). Negative for dizziness, tremors, syncope, weakness, light-headedness and headaches. Psychiatric/Behavioral: Negative for dysphoric mood and sleep disturbance. The patient is nervous/anxious (on sunday nights when she has to go back to work on Mondays).         Allergies   Allergen Reactions    Morphine Palpations: Abdomen is soft. Tenderness: There is no abdominal tenderness. Musculoskeletal:      Left hand: She exhibits no tenderness and no swelling. Right lower leg: No edema. Left lower leg: No edema. Lymphadenopathy:      Head:      Right side of head: No submandibular adenopathy. Left side of head: No submandibular adenopathy. Neurological:      Mental Status: She is alert and oriented to person, place, and time. No results found for this visit on 02/03/20. Lab Review   No visits with results within 6 Month(s) from this visit.    Latest known visit with results is:   Orders Only on 07/01/2019   Component Date Value    Hemoglobin A1C 07/01/2019 6.1     eAG 07/01/2019 128.4     TSH 07/01/2019 1.40     Cholesterol, Total 07/01/2019 246*    Triglycerides 07/01/2019 99     HDL 07/01/2019 65*    LDL Calculated 07/01/2019 161*    VLDL Cholesterol Calcula* 07/01/2019 20     WBC 07/01/2019 4.6     RBC 07/01/2019 4.90     Hemoglobin 07/01/2019 14.2     Hematocrit 07/01/2019 43.0     MCV 07/01/2019 87.7     MCH 07/01/2019 29.1     MCHC 07/01/2019 33.1     RDW 07/01/2019 13.7     Platelets 69/41/6988 256     MPV 07/01/2019 9.5     Neutrophils % 07/01/2019 35.4     Lymphocytes % 07/01/2019 40.6     Monocytes % 07/01/2019 8.3     Eosinophils % 07/01/2019 14.8     Basophils % 07/01/2019 0.9     Neutrophils Absolute 07/01/2019 1.6*    Lymphocytes Absolute 07/01/2019 1.9     Monocytes Absolute 07/01/2019 0.4     Eosinophils Absolute 07/01/2019 0.7*    Basophils Absolute 07/01/2019 0.0     Sodium 07/01/2019 143     Potassium 07/01/2019 4.7     Chloride 07/01/2019 103     CO2 07/01/2019 24     Anion Gap 07/01/2019 16     Glucose 07/01/2019 110*    BUN 07/01/2019 10     CREATININE 07/01/2019 1.1     GFR Non- 07/01/2019 51*    GFR  07/01/2019 >60     Calcium 07/01/2019 10.2     Total Protein 07/01/2019 7.3     Alb

## 2020-02-04 LAB
CHOLESTEROL, TOTAL: 239 MG/DL (ref 0–199)
ESTIMATED AVERAGE GLUCOSE: 111.2 MG/DL
HBA1C MFR BLD: 5.5 %
HDLC SERPL-MCNC: 62 MG/DL (ref 40–60)
LDL CHOLESTEROL CALCULATED: 161 MG/DL
TRIGL SERPL-MCNC: 78 MG/DL (ref 0–150)
VLDLC SERPL CALC-MCNC: 16 MG/DL

## 2020-04-28 NOTE — PROGRESS NOTES
The [employer] would not go through, as only an MD can order.    Please sign.    Thank you~   Constitutional: She is oriented to person, place, and time. She appears well-developed and well-nourished. No distress. Obese middle aged BF   HENT:   Head: Normocephalic and atraumatic. Right Ear: Hearing, tympanic membrane and ear canal normal.   Left Ear: Hearing, tympanic membrane and ear canal normal.   Nose: Nose normal.   Mouth/Throat: Uvula is midline, oropharynx is clear and moist and mucous membranes are normal.   Eyes: Conjunctivae, EOM and lids are normal. Pupils are equal, round, and reactive to light. Neck: Neck supple. Carotid bruit is not present. No thyromegaly present. Cardiovascular: Normal rate, regular rhythm, S1 normal, S2 normal, normal heart sounds and intact distal pulses. Exam reveals no gallop and no friction rub. No murmur heard. Pulmonary/Chest: Effort normal and breath sounds normal. No respiratory distress. She has no wheezes. She has no rhonchi. She has no rales. Right breast exhibits no inverted nipple, no mass, no nipple discharge, no skin change and no tenderness. Left breast exhibits no inverted nipple, no mass, no nipple discharge, no skin change and no tenderness. Breasts are symmetrical.   Abdominal: Soft. Bowel sounds are normal. She exhibits no distension and no mass. There is no hepatosplenomegaly. There is no tenderness. There is no rebound. Genitourinary:   Genitourinary Comments: Deferred   Musculoskeletal: Normal range of motion. She exhibits no edema. Right shoulder: She exhibits normal range of motion and no tenderness. Left shoulder: She exhibits normal range of motion and no tenderness. Right elbow: She exhibits no swelling. No tenderness found. Left elbow: She exhibits no swelling. No tenderness found. Right wrist: She exhibits no tenderness and no swelling. Left wrist: She exhibits no tenderness and no swelling. Right hip: She exhibits no tenderness. Left hip: She exhibits no tenderness. UA neg        Assessment/Plan     1. Annual physical exam  - POCT Urinalysis no Micro  - Comprehensive Metabolic Panel; Future  - CBC Auto Differential; Future  - Lipid Panel; Future fasting  - TSH without Reflex; Future  - Patient given order for screening mammogram  - Patient given a referral information to a Gynecologist for annual Gyn exam and pap smear  -Patient declines a colonoscopy  -Complete FIT testing for colon cancer screening and return  -Tdap done on 7/23/13  - Flu shot done on 10/6/17  -Regular aerobic exercise    2. Prediabetes  - Hemoglobin A1C; Future  -low carbohydrate diet  -Regular aerobic exercise    3. Hyperlipidemia, unspecified hyperlipidemia type  -Low fat, low cholesterol diet  -Regular aerobic exercise  - Lipid Panel; Future fasting    4. Need for hepatitis C screening test  - Hepatitis C Antibody; Future    5. Encounter for screening mammogram for breast cancer  - CHLOE Screening Bilateral; Future order given    6. Colon cancer screening  - POCT Fecal Immunochemical Test (FIT);  Future  -Complete FIT testing for colon cancer screening and return  - patient declines colonoscopy      Return in about 1 year (around 11/1/2018) for annual physical exam.

## 2020-07-24 ENCOUNTER — TELEPHONE (OUTPATIENT)
Dept: PRIMARY CARE CLINIC | Age: 55
End: 2020-07-24

## 2020-07-24 NOTE — TELEPHONE ENCOUNTER
----- Message from Max Edge sent at 7/24/2020  3:28 PM EDT -----  Subject: Message to Provider    QUESTIONS  Information for Provider? Pt. wants to R/S schedule her appt. for 6 M.O.   out. Please advise and call pt. back. ---------------------------------------------------------------------------  --------------  Yenifer MINA  What is the best way for the office to contact you? OK to leave message on   voicemail  Do not leave any message   patient will call back for answer  Preferred Call Back Phone Number? [phone]  ---------------------------------------------------------------------------  --------------  SCRIPT ANSWERS  Relationship to Patient?  Self

## 2022-03-21 ENCOUNTER — HOSPITAL ENCOUNTER (EMERGENCY)
Age: 57
Discharge: HOME | End: 2022-03-21
Attending: EMERGENCY MEDICINE
Payer: COMMERCIAL

## 2022-03-21 ENCOUNTER — APPOINTMENT (OUTPATIENT)
Dept: GENERAL RADIOLOGY | Age: 57
End: 2022-03-21
Payer: COMMERCIAL

## 2022-03-21 VITALS
RESPIRATION RATE: 16 BRPM | SYSTOLIC BLOOD PRESSURE: 129 MMHG | TEMPERATURE: 97.6 F | WEIGHT: 187.2 LBS | HEART RATE: 72 BPM | DIASTOLIC BLOOD PRESSURE: 81 MMHG | BODY MASS INDEX: 34.45 KG/M2 | OXYGEN SATURATION: 98 % | HEIGHT: 62 IN

## 2022-03-21 DIAGNOSIS — M25.532 WRIST PAIN, ACUTE, LEFT: Primary | ICD-10-CM

## 2022-03-21 PROCEDURE — 73110 X-RAY EXAM OF WRIST: CPT

## 2022-03-21 PROCEDURE — 29125 APPL SHORT ARM SPLINT STATIC: CPT

## 2022-03-21 PROCEDURE — 99284 EMERGENCY DEPT VISIT MOD MDM: CPT

## 2022-03-21 RX ORDER — KETOPROFEN 50 MG/1
50 CAPSULE ORAL 3 TIMES DAILY PRN
Qty: 20 CAPSULE | Refills: 0 | Status: SHIPPED | OUTPATIENT
Start: 2022-03-21 | End: 2022-07-26

## 2022-03-21 ASSESSMENT — PAIN DESCRIPTION - ORIENTATION: ORIENTATION: LEFT

## 2022-03-21 ASSESSMENT — PAIN DESCRIPTION - PAIN TYPE: TYPE: ACUTE PAIN

## 2022-03-21 ASSESSMENT — PAIN SCALES - GENERAL: PAINLEVEL_OUTOF10: 8

## 2022-03-21 ASSESSMENT — PAIN - FUNCTIONAL ASSESSMENT: PAIN_FUNCTIONAL_ASSESSMENT: 0-10

## 2022-03-21 ASSESSMENT — PAIN DESCRIPTION - LOCATION: LOCATION: WRIST

## 2022-03-21 NOTE — ED PROVIDER NOTES
HISTORY       Past Surgical History:   Procedure Laterality Date     SECTION      X's 3         CURRENT MEDICATIONS       Previous Medications    ASCORBIC ACID (VITAMIN C) 250 MG TABLET    Take 250 mg by mouth Twice a Week    B COMPLEX VITAMINS (VITAMIN-B COMPLEX PO)    Take by mouth    CALCIUM CARBONATE PO    Take by mouth Twice a Week     CHOLECALCIFEROL (VITAMIN D PO)    Take by mouth    CINNAMON PO    Take 1 capsule by mouth Twice a Week    ECHINACEA PO    Take by mouth    ELASTIC BANDAGES & SUPPORTS (WRIST SUPPORT) MISC    Dispense wrist supports for right and left wrist    GARLIC PO    Take by mouth    HYDROCORTISONE 2.5 % CREAM    Apply topically 2 times daily.     IRON COMBINATIONS (IRON COMPLEX PO)    Take by mouth Twice a Week     MULTIPLE VITAMINS-CALCIUM (ONE-A-DAY WOMENS FORMULA PO)    Take by mouth Twice a Week     NAPROXEN (NAPROSYN) 500 MG TABLET    Take 1 tablet by mouth 2 times daily for 20 doses    TURMERIC PO    Take by mouth    VITAMIN A PO    Take by mouth    ZINC PO    Take by mouth       ALLERGIES     Morphine    FAMILY HISTORY       Family History   Problem Relation Age of Onset    Diabetes Mother     High Blood Pressure Mother     Heart Disease Mother     Kidney Disease Mother     High Cholesterol Mother     Diabetes Father     High Cholesterol Father     High Blood Pressure Sister           SOCIAL HISTORY       Social History     Socioeconomic History    Marital status:      Spouse name: None    Number of children: None    Years of education: None    Highest education level: None   Occupational History    None   Tobacco Use    Smoking status: Never Smoker    Smokeless tobacco: Never Used   Vaping Use    Vaping Use: Never used   Substance and Sexual Activity    Alcohol use: Not Currently     Comment: occ    Drug use: No    Sexual activity: Yes     Partners: Male   Other Topics Concern    None   Social History Narrative    None     Social Determinants of Health     Financial Resource Strain:     Difficulty of Paying Living Expenses: Not on file   Food Insecurity:     Worried About Running Out of Food in the Last Year: Not on file    Germán of Food in the Last Year: Not on file   Transportation Needs:     Lack of Transportation (Medical): Not on file    Lack of Transportation (Non-Medical): Not on file   Physical Activity:     Days of Exercise per Week: Not on file    Minutes of Exercise per Session: Not on file   Stress:     Feeling of Stress : Not on file   Social Connections:     Frequency of Communication with Friends and Family: Not on file    Frequency of Social Gatherings with Friends and Family: Not on file    Attends Anabaptism Services: Not on file    Active Member of [address] [place] [address] [employer] or Organizations: Not on file    Attends Club or Organization Meetings: Not on file    Marital Status: Not on file   Intimate Partner Violence:     Fear of Current or Ex-Partner: Not on file    Emotionally Abused: Not on file    Physically Abused: Not on file    Sexually Abused: Not on file   Housing Stability:     Unable to Pay for Housing in the Last Year: Not on file    Number of [place] in the Last Year: Not on file    Unstable Housing in the Last Year: Not on file       SCREENINGS    Rohan Coma Scale  Eye Opening: Spontaneous  Best Verbal Response: Oriented  Best Motor Response: Obeys commands  [place] Coma Scale Score: 15        PHYSICAL EXAM    (up to 7 for level 4, 8 or more for level 5)     ED Triage Vitals [03/21/22 0456]   BP Temp Temp Source Pulse Resp SpO2 Height Weight   (!) 139/97 97.6 °F (36.4 °C) Oral 76 16 97 % 5' 2\" (1.575 m) 187 lb 3.2 oz (84.9 kg)           General Appearance:  Alert, cooperative, no distress, appears stated age. Head:  Normocephalic, without obvious abnormality, atraumatic. Eyes:  conjunctiva/corneas clear, EOM's intact. Sclera anicteric.    ENT: Mucous membranes moist.   Neck: Supple, symmetrical, trachea midline, no adenopathy. No jugular venous distention. Lungs:   No Respiratory Distress. no rales  rhonchi rub   Chest Wall:  Nontender  no deformity   Heart:  Rsr no murmer gallop    Abdomen:   Soft nontender no organomegally    Extremities:  Full range of motion. no deformity left wrist no deformity nontender to palpation snuffbox nontender thumb good ligamentous stability to stressing   Pulses: Equal  upper and lower    Skin:  No rashes or lesions to exposed skin. Neurologic: Alert and oriented X 3. Motor grossly normal.  Speech clear. DIAGNOSTIC RESULTS   LABS:    Labs Reviewed - No data to display    All other labs were within normal range or not returned as of thisdictation. EKG: All EKG's are interpreted by the Emergency Department Physician who either signs or Co-signs this chart in the absence of a cardiologist.        RADIOLOGY:   Non-plain film images such as CT, Ultrasound and MRI are read by the radiologist. [place] images are visualized and preliminarily interpreted by the  ED Provider with the belowfindings:        Interpretation per the Radiologist below, if available at the time of this note:    XR WRIST LEFT (MIN 3 VIEWS)   Final Result   No acute osseous abnormality identified. PROCEDURES   Unless otherwise noted below, none     Procedures    CRITICAL CARE TIME   N/A      CONSULTS:  None    EMERGENCY DEPARTMENT COURSE and DIFFERENTIAL DIAGNOSIS/MDM:   Vitals:    Vitals:    03/21/22 0456   BP: (!) 139/97   Pulse: 76   Resp: 16   Temp: 97.6 °F (36.4 °C)   TempSrc: Oral   SpO2: 97%   Weight: 187 lb 3.2 oz (84.9 kg)   Height: 5' 2\" (1.575 m)       Patient was given the following medications:  Medications - No data to display    X-rays unremarkable patient was given a Velcro splint ketoprofen as needed    The patient tolerated their visit well. Thepatient and / or the family were informed of the results of any tests, a time was given to answer questions.     FINAL IMPRESSION 1.  Wrist pain, acute, left        DISPOSITION/PLAN   DISPOSITION Decision To Discharge 03/21/2022 05:45:20 AM      PATIENT REFERRED TO:  Crystal Forman MD  [address] [place] [address] Drive [address] [phone]            DISCHARGE MEDICATIONS:  New Prescriptions    KETOPROFEN (ORUDIS) 50 MG CAPSULE    Take 1 capsule by mouth 3 times daily as needed for Pain       DISCONTINUED MEDICATIONS:  Discontinued Medications    No medications on file              (Please note that portions of this note were completed with a voice recognition program.  Efforts were made to edit the dictations but occasionally words aremis-transcribed.)    Karina Foster MD (electronically signed)          Karina Foster MD  03/21/22 0616

## 2022-07-26 ENCOUNTER — OFFICE VISIT (OUTPATIENT)
Dept: PRIMARY CARE CLINIC | Age: 57
End: 2022-07-26
Payer: COMMERCIAL

## 2022-07-26 VITALS
HEIGHT: 62 IN | TEMPERATURE: 97.3 F | BODY MASS INDEX: 34.16 KG/M2 | RESPIRATION RATE: 16 BRPM | SYSTOLIC BLOOD PRESSURE: 120 MMHG | OXYGEN SATURATION: 99 % | HEART RATE: 71 BPM | WEIGHT: 185.6 LBS | DIASTOLIC BLOOD PRESSURE: 80 MMHG

## 2022-07-26 DIAGNOSIS — M25.531 BILATERAL WRIST PAIN: ICD-10-CM

## 2022-07-26 DIAGNOSIS — Z12.11 COLON CANCER SCREENING: ICD-10-CM

## 2022-07-26 DIAGNOSIS — E78.2 MIXED HYPERLIPIDEMIA: ICD-10-CM

## 2022-07-26 DIAGNOSIS — M79.641 BILATERAL HAND PAIN: ICD-10-CM

## 2022-07-26 DIAGNOSIS — Z00.00 ANNUAL PHYSICAL EXAM: ICD-10-CM

## 2022-07-26 DIAGNOSIS — M79.642 BILATERAL HAND PAIN: ICD-10-CM

## 2022-07-26 DIAGNOSIS — R82.998 LEUKOCYTES IN URINE: ICD-10-CM

## 2022-07-26 DIAGNOSIS — Z00.00 ANNUAL PHYSICAL EXAM: Primary | ICD-10-CM

## 2022-07-26 DIAGNOSIS — Z12.31 ENCOUNTER FOR SCREENING MAMMOGRAM FOR BREAST CANCER: ICD-10-CM

## 2022-07-26 DIAGNOSIS — G56.03 BILATERAL CARPAL TUNNEL SYNDROME: ICD-10-CM

## 2022-07-26 DIAGNOSIS — R73.03 PREDIABETES: ICD-10-CM

## 2022-07-26 DIAGNOSIS — M25.532 BILATERAL WRIST PAIN: ICD-10-CM

## 2022-07-26 DIAGNOSIS — R20.0 FINGER NUMBNESS: ICD-10-CM

## 2022-07-26 LAB
A/G RATIO: 2 (ref 1.1–2.2)
ALBUMIN SERPL-MCNC: 4.8 G/DL (ref 3.4–5)
ALP BLD-CCNC: 71 U/L (ref 40–129)
ALT SERPL-CCNC: 15 U/L (ref 10–40)
ANION GAP SERPL CALCULATED.3IONS-SCNC: 11 MMOL/L (ref 3–16)
AST SERPL-CCNC: 23 U/L (ref 15–37)
BASOPHILS ABSOLUTE: 0 K/UL (ref 0–0.2)
BASOPHILS RELATIVE PERCENT: 0.7 %
BILIRUB SERPL-MCNC: 0.8 MG/DL (ref 0–1)
BILIRUBIN, POC: NORMAL
BLOOD URINE, POC: NORMAL
BUN BLDV-MCNC: 12 MG/DL (ref 7–20)
CALCIUM SERPL-MCNC: 9.7 MG/DL (ref 8.3–10.6)
CHLORIDE BLD-SCNC: 104 MMOL/L (ref 99–110)
CHOLESTEROL, TOTAL: 273 MG/DL (ref 0–199)
CLARITY, POC: CLEAR
CO2: 26 MMOL/L (ref 21–32)
COLOR, POC: YELLOW
CREAT SERPL-MCNC: 0.8 MG/DL (ref 0.6–1.1)
EOSINOPHILS ABSOLUTE: 0.1 K/UL (ref 0–0.6)
EOSINOPHILS RELATIVE PERCENT: 3.1 %
GFR AFRICAN AMERICAN: >60
GFR NON-AFRICAN AMERICAN: >60
GLUCOSE BLD-MCNC: 90 MG/DL (ref 70–99)
GLUCOSE URINE, POC: NORMAL
HCT VFR BLD CALC: 40.5 % (ref 36–48)
HDLC SERPL-MCNC: 82 MG/DL (ref 40–60)
HEMOGLOBIN: 13.6 G/DL (ref 12–16)
KETONES, POC: NORMAL
LDL CHOLESTEROL CALCULATED: 176 MG/DL
LEUKOCYTE EST, POC: NORMAL
LYMPHOCYTES ABSOLUTE: 1.7 K/UL (ref 1–5.1)
LYMPHOCYTES RELATIVE PERCENT: 43.6 %
MCH RBC QN AUTO: 29.8 PG (ref 26–34)
MCHC RBC AUTO-ENTMCNC: 33.6 G/DL (ref 31–36)
MCV RBC AUTO: 88.7 FL (ref 80–100)
MONOCYTES ABSOLUTE: 0.4 K/UL (ref 0–1.3)
MONOCYTES RELATIVE PERCENT: 11.3 %
NEUTROPHILS ABSOLUTE: 1.6 K/UL (ref 1.7–7.7)
NEUTROPHILS RELATIVE PERCENT: 41.3 %
NITRITE, POC: NORMAL
PDW BLD-RTO: 13.8 % (ref 12.4–15.4)
PH, POC: 6.5
PLATELET # BLD: 232 K/UL (ref 135–450)
PMV BLD AUTO: 9.1 FL (ref 5–10.5)
POTASSIUM SERPL-SCNC: 4.3 MMOL/L (ref 3.5–5.1)
PROTEIN, POC: NORMAL
RBC # BLD: 4.56 M/UL (ref 4–5.2)
SODIUM BLD-SCNC: 141 MMOL/L (ref 136–145)
SPECIFIC GRAVITY, POC: 1.01
TOTAL PROTEIN: 7.2 G/DL (ref 6.4–8.2)
TRIGL SERPL-MCNC: 76 MG/DL (ref 0–150)
TSH SERPL DL<=0.05 MIU/L-ACNC: 1.71 UIU/ML (ref 0.27–4.2)
UROBILINOGEN, POC: 0.2
VLDLC SERPL CALC-MCNC: 15 MG/DL
WBC # BLD: 4 K/UL (ref 4–11)

## 2022-07-26 PROCEDURE — 99396 PREV VISIT EST AGE 40-64: CPT | Performed by: INTERNAL MEDICINE

## 2022-07-26 PROCEDURE — 81002 URINALYSIS NONAUTO W/O SCOPE: CPT | Performed by: INTERNAL MEDICINE

## 2022-07-26 RX ORDER — CHLORAL HYDRATE 500 MG
3000 CAPSULE ORAL DAILY
COMMUNITY

## 2022-07-26 SDOH — ECONOMIC STABILITY: FOOD INSECURITY: WITHIN THE PAST 12 MONTHS, YOU WORRIED THAT YOUR FOOD WOULD RUN OUT BEFORE YOU GOT MONEY TO BUY MORE.: NEVER TRUE

## 2022-07-26 SDOH — ECONOMIC STABILITY: FOOD INSECURITY: WITHIN THE PAST 12 MONTHS, THE FOOD YOU BOUGHT JUST DIDN'T LAST AND YOU DIDN'T HAVE MONEY TO GET MORE.: NEVER TRUE

## 2022-07-26 ASSESSMENT — PATIENT HEALTH QUESTIONNAIRE - PHQ9
2. FEELING DOWN, DEPRESSED OR HOPELESS: 0
7. TROUBLE CONCENTRATING ON THINGS, SUCH AS READING THE NEWSPAPER OR WATCHING TELEVISION: 0
1. LITTLE INTEREST OR PLEASURE IN DOING THINGS: 0
4. FEELING TIRED OR HAVING LITTLE ENERGY: 0
3. TROUBLE FALLING OR STAYING ASLEEP: 0
5. POOR APPETITE OR OVEREATING: 0
10. IF YOU CHECKED OFF ANY PROBLEMS, HOW DIFFICULT HAVE THESE PROBLEMS MADE IT FOR YOU TO DO YOUR WORK, TAKE CARE OF THINGS AT HOME, OR GET ALONG WITH OTHER PEOPLE: 0
6. FEELING BAD ABOUT YOURSELF - OR THAT YOU ARE A FAILURE OR HAVE LET YOURSELF OR YOUR FAMILY DOWN: 0
SUM OF ALL RESPONSES TO PHQ QUESTIONS 1-9: 0
SUM OF ALL RESPONSES TO PHQ QUESTIONS 1-9: 0
8. MOVING OR SPEAKING SO SLOWLY THAT OTHER PEOPLE COULD HAVE NOTICED. OR THE OPPOSITE, BEING SO FIGETY OR RESTLESS THAT YOU HAVE BEEN MOVING AROUND A LOT MORE THAN USUAL: 0
SUM OF ALL RESPONSES TO PHQ9 QUESTIONS 1 & 2: 0
9. THOUGHTS THAT YOU WOULD BE BETTER OFF DEAD, OR OF HURTING YOURSELF: 0
SUM OF ALL RESPONSES TO PHQ QUESTIONS 1-9: 0
SUM OF ALL RESPONSES TO PHQ QUESTIONS 1-9: 0

## 2022-07-26 ASSESSMENT — SOCIAL DETERMINANTS OF HEALTH (SDOH): HOW HARD IS IT FOR YOU TO PAY FOR THE VERY BASICS LIKE FOOD, HOUSING, MEDICAL CARE, AND HEATING?: NOT HARD AT ALL

## 2022-07-26 NOTE — PATIENT INSTRUCTIONS
Well Visit, Women 48 to 72: Care Instructions  Overview     Well visits can help you stay healthy. Your doctor has checked your overall health and may have suggested ways to take good care of yourself. Your doctor also may have recommended tests. At home, you can help prevent illness withhealthy eating, regular exercise, and other steps. Follow-up care is a key part of your treatment and safety. Be sure to make and go to all appointments, and call your doctor if you are having problems. It's also a good idea to know your test results and keep alist of the medicines you take. How can you care for yourself at home? Get screening tests that you and your doctor decide on. Screening helps find diseases before any symptoms appear. Eat healthy foods. Choose fruits, vegetables, whole grains, protein, and low-fat dairy foods. Limit fat, especially saturated fat. Reduce salt in your diet. Limit alcohol. Have no more than 1 drink a day or 7 drinks a week. Get at least 30 minutes of exercise on most days of the week. Walking is a good choice. You also may want to do other activities, such as running, swimming, cycling, or playing tennis or team sports. Reach and stay at a healthy weight. This will lower your risk for many problems, such as obesity, diabetes, heart disease, and high blood pressure. Do not smoke. Smoking can make health problems worse. If you need help quitting, talk to your doctor about stop-smoking programs and medicines. These can increase your chances of quitting for good. Care for your mental health. It is easy to get weighed down by worry and stress. Learn strategies to manage stress, like deep breathing and mindfulness, and stay connected with your family and community. If you find you often feel sad or hopeless, talk with your doctor. Treatment can help. Talk to your doctor about whether you have any risk factors for sexually transmitted infections (STIs).  You can help prevent STIs if you wait to have sex with a new partner (or partners) until you've each been tested for STIs. It also helps if you use condoms (male or female condoms) and if you limit your sex partners to one person who only has sex with you. Vaccines are available for some STIs. If you think you may have a problem with alcohol or drug use, talk to your doctor. This includes prescription medicines (such as amphetamines and opioids) and illegal drugs (such as cocaine and methamphetamine). Your doctor can help you figure out what type of treatment is best for you. Protect your skin from too much sun. When you're outdoors from 10 a.m. to 4 p.m., stay in the shade or cover up with clothing and a hat with a wide brim. Wear sunglasses that block UV rays. Even when it's cloudy, put broad-spectrum sunscreen (SPF 30 or higher) on any exposed skin. See a dentist one or two times a year for checkups and to have your teeth cleaned. Wear a seat belt in the car. When should you call for help? Watch closely for changes in your health, and be sure to contact your doctor if you have any problems or symptoms that concern you. Where can you learn more? Go to [email or web address] org and sign in to your [employer] account. Enter I440 in the oroeco box to learn more about \"Well Visit, Women 50 to 72: Care Instructions. \"     If you do not have an account, please click on the \"Sign Up Now\" link. Current as of: October 6, 2021               Content Version: 13.3  © 2006-2022 Healthwise, Incorporated. Care instructions adapted under license by [facility] ([facility]). If you have questions about a medical condition or this instruction, always ask your healthcare professional. [place] [address] any warranty or liability for your use of this information.

## 2022-07-26 NOTE — LETTER
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 The extreme temperature flares up her carpal tunnel syndrome. If you have any questions or concerns, please don't hesitate to call.     Sincerely,        Malini Saucedo MD

## 2022-07-26 NOTE — PROGRESS NOTES
Well Adult Note  Name: Ravinder Little Date: 2022   MRN: [id number] Sex: Female   Age: 60 y.o. Ethnicity: Non- / Non    : 1965 Race: Black /       Chief Complaint   Patient presents with    Annual Exam     Patient is fasting       Britney Narvaez is here for well adult exam.  History:    Patient presents for annual physical exam. Patient has a Gynecologist. Pap smear done on 19. Patient has prediabetes. Patient states she kind of sort of decreases carbohydrates. Patient walks 3-6 miles per day at work and does reaching, bending, and pulling. Patient has hyperlipidemia. Patient is not on cholesterol medication. 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 Patient states her finger tips ache and she has intense pain with going into freezer. Patient states her wrists are weaker and it is harder to  and hold onto things. Patient states she has hand weakness. Patient states she has been taking Tylenol arthritis. Patient has a history of carpal tunnel. 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 Review of Systems   Constitutional:  Negative for activity change, appetite change, chills, diaphoresis, fatigue, fever and unexpected weight change. HENT:  Negative for congestion, ear discharge, ear pain, facial swelling, hearing loss, mouth sores, nosebleeds, postnasal drip, rhinorrhea, sinus pressure, sinus pain, sneezing, sore throat, tinnitus, trouble swallowing and voice change. Eyes:  Negative for photophobia, pain, discharge, redness, itching and visual disturbance. Respiratory:  Negative for apnea, cough, choking, chest tightness, shortness of breath and wheezing. Cardiovascular:  Negative for chest pain, palpitations and leg swelling.    Gastrointestinal:  Negative for abdominal distention, abdominal pain, anal bleeding, blood in stool, constipation, diarrhea, nausea, rectal pain and vomiting. Endocrine: Negative for cold intolerance and heat intolerance. Genitourinary:  Negative for decreased urine volume, difficulty urinating, dysuria, flank pain, frequency, genital sores, hematuria, menstrual problem, pelvic pain, urgency, vaginal bleeding, vaginal discharge and vaginal pain. Musculoskeletal:  Positive for arthralgias. Negative for back pain, gait problem, joint swelling, myalgias, neck pain and neck stiffness. Skin:  Negative for rash and wound. Neurological:  Positive for weakness. Negative for dizziness, tremors, seizures, syncope, facial asymmetry, speech difficulty, light-headedness, numbness and headaches. Hematological:  Negative for adenopathy. Does not bruise/bleed easily. Psychiatric/Behavioral:  Negative for decreased concentration, dysphoric mood and sleep disturbance. The patient is not nervous/anxious. All other systems reviewed and are negative. Allergies   Allergen Reactions    Morphine Itching         Prior to Visit Medications    Medication Sig Taking? Authorizing Provider   POTASSIUM CHLORIDE PO Take by mouth Yes Historical Provider, MD   Omega-3 Fatty Acids (FISH OIL) 1000 MG CAPS Take 3,000 mg by mouth in the morning.  Yes Historical Provider, MD   Flaxseed Linseed, (FLAXSEED OIL PO) Take by mouth Yes Historical Provider, MD   OREGANO PO Take by mouth Yes Historical ProviderMD [place] repens, (SAW PALMETTO PO) Take by mouth Yes Historical Provider, MD   MAGNESIUM OXIDE PO Take by mouth Yes Historical Provider, MD   Vitamin Mixture (VITAMIN E COMPLETE PO) Take by mouth Yes Historical Provider, MD   BIOTIN PO Take by mouth Yes Historical Provider, MD   Cobalamin Combinations (B12 FOLATE PO) Take by mouth Yes Historical Provider, MD   HAWTHORN PO Take by mouth Yes Historical Provider, MD   Nutritional Supplements (DHEA PO) Take by mouth Yes 3.2 oz (84.9 kg)   02/03/20 186 lb 9.6 oz (84.6 kg)     Additional Measurements    07/26/22 1550   Waist (Inches): 38 in         Physical Exam  Constitutional:       General: She is not in acute distress. Appearance: Normal appearance. HENT:      Head: Normocephalic and atraumatic. Right Ear: Tympanic membrane, ear canal and external ear normal.      Left Ear: Tympanic membrane, ear canal and external ear normal.      Nose: Nose normal.   Eyes:      General: Lids are normal.      Extraocular Movements: Extraocular movements intact. Conjunctiva/sclera: Conjunctivae normal.      Pupils: Pupils are equal, round, and reactive to light. Neck:      Thyroid: No thyromegaly. Vascular: No carotid bruit. Cardiovascular:      Rate and Rhythm: Normal rate and regular rhythm. Heart sounds: Normal heart sounds, S1 normal and S2 normal. No murmur heard. Pulmonary:      Effort: Pulmonary effort is normal.      Breath sounds: Normal breath sounds. Abdominal:      General: Bowel sounds are normal.      Palpations: Abdomen is soft. There is no hepatomegaly or splenomegaly. Tenderness: There is no abdominal tenderness. Musculoskeletal:      Right shoulder: No tenderness. Normal range of motion. Left shoulder: No tenderness. Normal range of motion. Right elbow: No tenderness. Left elbow: No tenderness. Right wrist: No swelling or tenderness. Left wrist: No swelling or tenderness. Right hand: Tenderness present. No swelling. Left hand: Tenderness present. No swelling. Cervical back: Neck supple. No tenderness. Thoracic back: No tenderness. Lumbar back: No tenderness. Normal range of motion. Right hip: No tenderness. Left hip: No tenderness. Right knee: No tenderness. Left knee: No tenderness. Right lower leg: No edema. Left lower leg: No edema. Right ankle: No swelling. No tenderness. Left ankle: No swelling. No tenderness. Right foot: No swelling. Left foot: No swelling. Lymphadenopathy:      Head:      Right side of head: No submandibular adenopathy. Left side of head: No submandibular adenopathy. Skin:     General: Skin is warm and dry. Neurological:      Mental Status: She is alert and oriented to person, place, and time. Gait: Gait normal.      Deep Tendon Reflexes: Reflexes are normal and symmetric. Psychiatric:         Attention and Perception: Attention normal.         Mood and Affect: Mood normal.         Speech: Speech normal.         Lab Review   No visits with results within 6 Month(s) from this visit. Latest known visit with results is:   Orders Only on 02/03/2020   Component Date Value    Cholesterol, Total 02/03/2020 239 (A)    Triglycerides 02/03/2020 78     HDL 02/03/2020 62 (A)    LDL Calculated 02/03/2020 161 (A)    VLDL Cholesterol Calcula* 02/03/2020 16     Hemoglobin A1C 02/03/2020 5.5     eAG 02/03/2020 111.2     WBC 02/03/2020 4.4     RBC 02/03/2020 4.99     Hemoglobin 02/03/2020 14.6     Hematocrit 02/03/2020 44.3     MCV 02/03/2020 88.9     MCH 02/03/2020 29.3     MCHC 02/03/2020 32.9     RDW 02/03/2020 13.3     Platelets 89/62/0471 242     MPV 02/03/2020 10.1     Neutrophils % 02/03/2020 36.4     Lymphocytes % 02/03/2020 46.0     Monocytes % 02/03/2020 12.9     Eosinophils % 02/03/2020 3.0     Basophils % 02/03/2020 1.7     Neutrophils Absolute 02/03/2020 1.6 (A)    Lymphocytes Absolute 02/03/2020 2.0     Monocytes Absolute 02/03/2020 0.6     Eosinophils Absolute 02/03/2020 0.1     Basophils Absolute 02/03/2020 0.1        Assessment   Plan   1. Annual physical exam  - CBC with Auto Differential; Future  - Comprehensive Metabolic Panel; Future  - Lipid Panel; Future  - TSH;  Future  - POCT Urinalysis no Micro  -Pap smear done on 7/1/2019  -Mammogram done on 7/27/2019 and patient given order for mammogram  -Colonoscopy done on 2/1/2016  -Tdap done on 7/23/2013  -Patient declines Shingrix vaccine  -Patient declines COVID-19 vaccine    2. Prediabetes  -Low carbohydrate diet  -Regular aerobic exercise  - Hemoglobin A1C; Future    3. Bilateral wrist pain  -Patient has carpal tunnel  -Tylenol 650mg 3 times daily as needed  -Wrist supports nightly  -Patient given letter for work    4. Bilateral hand pain  -Patient has carpal tunnel  -Tylenol 650mg 3 times daily as needed  -Wrist supports nightly  -Patient given letter for work    5. Finger numbness  -Patient has carpal tunnel  -Tylenol 650mg 3 times daily as needed  -Wrist supports nightly  -Patient given letter for work    6. Bilateral carpal tunnel syndrome  -Patient has carpal tunnel  -Tylenol 650mg 3 times daily as needed  -Wrist supports nightly  -Patient given letter for work    7. Mixed hyperlipidemia  -Patient is not on cholesterol-lowering medication  -Low fat, low cholesterol diet  -Regular aerobic exercise    8. Encounter for screening mammogram for breast cancer  - CHLOE DIGITAL SCREEN W OR WO CAD BILATERAL; Future    9. Colon cancer screening  - Fecal DNA Colorectal cancer screening (Cologuard)    10.  Leukocytes in urine  - Culture, Urine        Personalized Preventive Plan   Current Health Maintenance Status  Immunization History   Administered Date(s) Administered    Influenza Vaccine, unspecified formulation 10/06/2017    Influenza Virus Vaccine 11/11/2015    Tdap (Boostrix, Adacel) 06/11/2010, 07/23/2013        Health Maintenance   Topic Date Due    COVID-19 Vaccine (1) Never done    HIV screen  Never done    Breast cancer screen  07/27/2021    Cervical cancer screen  07/01/2022    Shingles vaccine (1 of 2) 07/26/2023 (Originally 11/23/2013)    Flu vaccine (1) 09/01/2022    DTaP/Tdap/Td vaccine (3 - Td or Tdap) 07/23/2023    A1C test (Diabetic or Prediabetic)  07/26/2023    Depression Screen  07/26/2023    Colorectal Cancer Screen  02/01/2026    Lipids  07/26/2027    Hepatitis C screen  Completed Hepatitis A vaccine  Aged Out    Hepatitis B vaccine  Aged Out    Hib vaccine  Aged Out    Meningococcal (ACWY) vaccine  Aged Out    Pneumococcal 0-64 years Vaccine  Aged Out     Recommendations for [employer] Due: see orders and patient instructions/AVS.    Return in about 1 year (around 7/26/2023) for annual physical exam.

## 2022-07-27 LAB
ESTIMATED AVERAGE GLUCOSE: 111.2 MG/DL
HBA1C MFR BLD: 5.5 %
URINE CULTURE, ROUTINE: NORMAL

## 2022-08-09 PROBLEM — M79.642 BILATERAL HAND PAIN: Status: ACTIVE | Noted: 2022-08-09

## 2022-08-09 PROBLEM — M25.531 BILATERAL WRIST PAIN: Status: ACTIVE | Noted: 2022-08-09

## 2022-08-09 PROBLEM — M25.532 BILATERAL WRIST PAIN: Status: ACTIVE | Noted: 2022-08-09

## 2022-08-09 PROBLEM — M79.641 BILATERAL HAND PAIN: Status: ACTIVE | Noted: 2022-08-09

## 2022-08-09 PROBLEM — R20.0 FINGER NUMBNESS: Status: ACTIVE | Noted: 2022-08-09

## 2022-08-09 PROBLEM — G56.03 BILATERAL CARPAL TUNNEL SYNDROME: Status: ACTIVE | Noted: 2022-08-09

## 2022-08-09 ASSESSMENT — ENCOUNTER SYMPTOMS
ABDOMINAL PAIN: 0
CONSTIPATION: 0
BACK PAIN: 0
EYE ITCHING: 0
RECTAL PAIN: 0
ANAL BLEEDING: 0
VOICE CHANGE: 0
COUGH: 0
WHEEZING: 0
EYE DISCHARGE: 0
VOMITING: 0
ABDOMINAL DISTENTION: 0
APNEA: 0
FACIAL SWELLING: 0
RHINORRHEA: 0
EYE REDNESS: 0
SINUS PAIN: 0
SINUS PRESSURE: 0
SHORTNESS OF BREATH: 0
TROUBLE SWALLOWING: 0
SORE THROAT: 0
EYE PAIN: 0
DIARRHEA: 0
CHOKING: 0
BLOOD IN STOOL: 0
NAUSEA: 0
CHEST TIGHTNESS: 0
PHOTOPHOBIA: 0

## 2022-08-30 LAB — NONINV COLON CA DNA+OCC BLD SCRN STL QL: NEGATIVE

## 2023-01-27 ENCOUNTER — TELEPHONE (OUTPATIENT)
Dept: PRIMARY CARE CLINIC | Age: 58
End: 2023-01-27

## 2023-01-27 DIAGNOSIS — R73.03 PREDIABETES: ICD-10-CM

## 2023-01-27 DIAGNOSIS — Z00.00 ANNUAL PHYSICAL EXAM: Primary | ICD-10-CM

## 2023-01-27 DIAGNOSIS — E78.2 MIXED HYPERLIPIDEMIA: ICD-10-CM

## 2023-01-27 DIAGNOSIS — M25.532 BILATERAL WRIST PAIN: ICD-10-CM

## 2023-01-27 DIAGNOSIS — M25.531 BILATERAL WRIST PAIN: ICD-10-CM

## 2023-01-27 DIAGNOSIS — Z13.1 SCREENING FOR DIABETES MELLITUS: ICD-10-CM

## 2023-01-27 NOTE — TELEPHONE ENCOUNTER
----- Message from [facility] sent at 1/27/2023  9:26 AM EST -----  Subject: Referral Request    Reason for referral request? Physical Labs, and Estrogen levels   Provider patient wants to be referred to(if known):     Provider Phone Number(if known): Additional Information for Provider? Patient would like to have physical   labs and her estrogen levels check when she comes in for her physical on   2/24/2023.  Please contact patient to further assist.   ---------------------------------------------------------------------------  --------------  Josesito Mann INFO    [phone]; OK to leave message on voicemail  ---------------------------------------------------------------------------  --------------

## 2023-01-27 NOTE — TELEPHONE ENCOUNTER
Please see message. ----- Message from [facility] sent at 1/27/2023  9:26 AM EST -----  Subject: Referral Request     Reason for referral request? Physical Labs, and Estrogen levels   Provider patient wants to be referred to(if known):      Provider Phone Number(if known): Additional Information for Provider? Patient would like to have physical   labs and her estrogen levels check when she comes in for her physical on   2/24/2023.  Please contact patient to further assist.   ---------------------------------------------------------------------------  --------------  [facility] [place][note 2]     [phone]; OK to leave message on voicemail  ---------------------------------------------------------------------------

## 2023-02-01 NOTE — TELEPHONE ENCOUNTER
Spoke with patient. Her last physical was on 7/26/22. Patient states her job's wellness wants annual physical done by 2/28/22 which would give her a savings on her insurance. Labs ordered. Talked to patient about estrogen. Patient is already postmenopausal and past vasomotor symptoms. Patient hasn't seen Gynecology since the pandemic. Patient states she is tired all the time. Told patient I will order physical labs and she can start a multivitamin once daily, work on sleep, and exercise and if labs ok will check other if needed.

## 2023-12-14 ENCOUNTER — HOSPITAL ENCOUNTER (EMERGENCY)
Age: 58
Discharge: HOME | End: 2023-12-14
Attending: EMERGENCY MEDICINE
Payer: COMMERCIAL

## 2023-12-14 ENCOUNTER — APPOINTMENT (OUTPATIENT)
Dept: GENERAL RADIOLOGY | Age: 58
End: 2023-12-14
Payer: COMMERCIAL

## 2023-12-14 VITALS
TEMPERATURE: 98.2 F | OXYGEN SATURATION: 100 % | BODY MASS INDEX: 33.84 KG/M2 | WEIGHT: 185 LBS | RESPIRATION RATE: 18 BRPM | DIASTOLIC BLOOD PRESSURE: 116 MMHG | SYSTOLIC BLOOD PRESSURE: 166 MMHG | HEART RATE: 67 BPM
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
## 2023-12-14 DIAGNOSIS — M25.562 ACUTE PAIN OF LEFT KNEE: ICD-10-CM

## 2023-12-14 DIAGNOSIS — R03.0 ELEVATED BLOOD PRESSURE READING: ICD-10-CM

## 2023-12-14 PROCEDURE — 90714 TD VACC NO PRESV 7 YRS+ IM: CPT | Performed by: EMERGENCY MEDICINE

## 2023-12-14 PROCEDURE — 73562 X-RAY EXAM OF KNEE 3: CPT

## 2023-12-14 PROCEDURE — 99284 EMERGENCY DEPT VISIT MOD MDM: CPT

## 2023-12-14 PROCEDURE — 6360000002 HC RX W HCPCS: Performed by: EMERGENCY MEDICINE

## 2023-12-14 PROCEDURE — 6370000000 HC RX 637 (ALT 250 FOR IP): Performed by: EMERGENCY MEDICINE

## 2023-12-14 PROCEDURE — 90471 IMMUNIZATION ADMIN: CPT | Performed by: EMERGENCY MEDICINE

## 2023-12-14 RX ORDER — ACETAMINOPHEN 500 MG
1000 TABLET ORAL ONCE
Status: COMPLETED | OUTPATIENT
Start: 2023-12-14 | End: 2023-12-14

## 2023-12-14 RX ORDER — NAPROXEN 250 MG/1
500 TABLET ORAL ONCE
Status: COMPLETED | OUTPATIENT
Start: 2023-12-14 | End: 2023-12-14

## 2023-12-14 RX ORDER — NAPROXEN 500 MG/1
500 TABLET ORAL 2 TIMES DAILY WITH MEALS
Qty: 20 TABLET | Refills: 0 | Status: SHIPPED | OUTPATIENT
Start: 2023-12-14 | End: 2023-12-24

## 2023-12-14 RX ADMIN — CLOSTRIDIUM TETANI TOXOID ANTIGEN (FORMALDEHYDE INACTIVATED) AND CORYNEBACTERIUM DIPHTHERIAE TOXOID ANTIGEN (FORMALDEHYDE INACTIVATED) 0.5 ML: 5; 2 INJECTION, SUSPENSION INTRAMUSCULAR at 07:39

## 2023-12-14 RX ADMIN — ACETAMINOPHEN 1000 MG: 500 TABLET ORAL at 07:40

## 2023-12-14 RX ADMIN — NAPROXEN 500 MG: 250 TABLET ORAL at 07:40

## 2023-12-14 ASSESSMENT — PAIN DESCRIPTION - ORIENTATION: ORIENTATION: LEFT

## 2023-12-14 ASSESSMENT — PAIN DESCRIPTION - LOCATION: LOCATION: KNEE

## 2023-12-14 ASSESSMENT — PAIN - FUNCTIONAL ASSESSMENT: PAIN_FUNCTIONAL_ASSESSMENT: 0-10

## 2023-12-14 ASSESSMENT — PAIN SCALES - GENERAL: PAINLEVEL_OUTOF10: 7

## 2024-01-04 ENCOUNTER — OFFICE VISIT (OUTPATIENT)
Dept: ORTHOPEDIC SURGERY | Age: 59
End: 2024-01-04

## 2024-01-04 VITALS — WEIGHT: 201 LBS | BODY MASS INDEX: 36.99 KG/M2 | HEIGHT: 62 IN | RESPIRATION RATE: 16 BRPM

## 2024-01-04 DIAGNOSIS — S80.02XA CONTUSION OF LEFT KNEE, INITIAL ENCOUNTER: Primary | ICD-10-CM

## 2024-01-04 NOTE — PROGRESS NOTES
Take by mouth      Elastic Bandages & Supports (WRIST SUPPORT) MISC Dispense wrist supports for right and left wrist 2 each 0    CINNAMON PO Take 1 capsule by mouth Twice a Week      Ascorbic Acid (VITAMIN C) 250 MG tablet Take 1 tablet by mouth Twice a Week      CALCIUM CARBONATE PO Take by mouth Twice a Week        No current facility-administered medications for this visit.       Allergies   Allergen Reactions    Morphine Itching         Physical Examination:     Vital signs:   Resp 16   Ht 1.575 m (5' 2\")   Wt 91.2 kg (201 lb)   LMP 07/22/2015   BMI 36.76 kg/m²     General:  alert, appears stated age, cooperative, and no distress   Gait:  Antalgic. The patient can bear weight on the injured extremity.     Left Knee  ROM:  0 degrees extension to 120 degrees flexion   Bilateral knee   Joint Tenderness: Proximal tibia   Effusion:   0 cc   Anterior drawer test:  negative   Right knee: negative   Posterior drawer:   negative   Right knee: negative     She does have a slight superficial abrasion measuring approximately 1 cm at the anterior tibia.      Imaging:  Left Knee X-Ray:  Normal joint spaces.  Normal alignment.  No fracture      Assessment:     Left knee contusion  Class 1 obesity        Plan:     Natural history and expected course discussed. Questions answered.  We discussed that I believe that her symptoms are still secondary to knee contusion.  Discussed there is no no effusion in her knee and no signs of any fracture on x-rays.  No ligamentous laxity.  However she may have some bone bruising.    We discussed icing her knee/leg several times a day.  Make sure ice does not directly contact the skin.    Discussed continue NSAIDs    Letter for work restrictions provided.  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                Giovanny Velazquez MD  Orthopaedic Surgery and Sports Medicine     Disclaimer:  This note was generated with use of

## 2024-01-18 ENCOUNTER — OFFICE VISIT (OUTPATIENT)
Dept: ORTHOPEDIC SURGERY | Age: 59
End: 2024-01-18
Payer: COMMERCIAL

## 2024-01-18 VITALS — HEIGHT: 62 IN | BODY MASS INDEX: 36.99 KG/M2 | RESPIRATION RATE: 16 BRPM | WEIGHT: 201 LBS

## 2024-01-18 DIAGNOSIS — S80.02XA CONTUSION OF LEFT KNEE, INITIAL ENCOUNTER: Primary | ICD-10-CM

## 2024-01-18 DIAGNOSIS — S83.92XA SPRAIN OF LEFT KNEE, UNSPECIFIED LIGAMENT, INITIAL ENCOUNTER: ICD-10-CM

## 2024-01-18 PROCEDURE — 99213 OFFICE O/P EST LOW 20 MIN: CPT | Performed by: ORTHOPAEDIC SURGERY

## 2024-01-18 NOTE — PROGRESS NOTES
CHIEF COMPLAINT: Left knee pain.    DATE OF INJURY: 23    History:   Jayashree Peter is a 58 y.o. female here for follow-up.    Initial history: referred by [facility] ER for evaluation and treatment of left knee pain / injury. The patient complains of left knee pain. This is evaluated as a workers compensation injury.  The pain began 5 days ago. Rates pain 4-8/10. There was a history of injury. She was moving a cart when she slipped off a curb and landed on her left knee.   The pain is located anteriorly.  Symptoms are worse with knee flexion, extension, and getting up from sitting.  She states that it is sensitive to touch  The knee has not given out or felt unstable.  She has been in a knee immobilizer from the ER.  The patient can bend and straighten the knee fully. There is no swelling in the knee.  There was not catching / locking of the knee.  The patient has not had PT. The patient has not had an injection. The patient has taken NSAIDs.  She has taken naproxen from the ER and ibuprofen 400 mg at the same time   The patient has tried ice, without relief.  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    Interval History: Her knee is slowly progressing along.  She rates pain 4/10.  Pain is still along her proximal tibia.  It is less frequent and not as constant.  She has been using ice and ibuprofen.  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  She will like to try to return to work.        Past Medical History:   Diagnosis Date    Hyperlipidemia 3/20/2016    Prediabetes 3/20/2016       Past Surgical History:   Procedure Laterality Date     SECTION      X's 3       Family History   Problem Relation Age of Onset    Diabetes Mother     High Blood Pressure Mother     Heart Disease Mother     Kidney Disease Mother     High Cholesterol Mother     Diabetes Father     High Cholesterol Father     High

## 2024-02-15 ENCOUNTER — OFFICE VISIT (OUTPATIENT)
Dept: ORTHOPEDIC SURGERY | Age: 59
End: 2024-02-15
Payer: COMMERCIAL

## 2024-02-15 ENCOUNTER — TELEPHONE (OUTPATIENT)
Dept: ORTHOPEDIC SURGERY | Age: 59
End: 2024-02-15

## 2024-02-15 VITALS — RESPIRATION RATE: 16 BRPM | WEIGHT: 199 LBS | BODY MASS INDEX: 36.62 KG/M2 | HEIGHT: 62 IN

## 2024-02-15 DIAGNOSIS — S80.02XA CONTUSION OF LEFT KNEE, INITIAL ENCOUNTER: Primary | ICD-10-CM

## 2024-02-15 PROCEDURE — 99213 OFFICE O/P EST LOW 20 MIN: CPT | Performed by: ORTHOPAEDIC SURGERY

## 2024-02-15 NOTE — PROGRESS NOTES
CHIEF COMPLAINT: Left knee pain.    DATE OF INJURY: 23    History:   Jayashree Peter is a 58 y.o. female here for follow-up.    Initial history: referred by [facility] ER for evaluation and treatment of left knee pain / injury. The patient complains of left knee pain. This is evaluated as a workers compensation injury.  The pain began 5 days ago. Rates pain 4-8/10. There was a history of injury. She was moving a cart when she slipped off a curb and landed on her left knee.   The pain is located anteriorly.  Symptoms are worse with knee flexion, extension, and getting up from sitting.  She states that it is sensitive to touch  The knee has not given out or felt unstable.  She has been in a knee immobilizer from the ER.  The patient can bend and straighten the knee fully. There is no swelling in the knee.  There was not catching / locking of the knee.  The patient has not had PT. The patient has not had an injection. The patient has taken NSAIDs.  She has taken naproxen from the ER and ibuprofen 400 mg at the same time   The patient has tried ice, without relief.  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    Interval History: She is still having sharp stabbing pain along her proximal tibia.  She rates pain 5/10.  Pain is worse when she has been on her feet all day.  Wearing the brace at work does help. 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  She has been taking the 5 minutes sit down breaks every hour.      Past Medical History:   Diagnosis Date    Hyperlipidemia 3/20/2016    Prediabetes 3/20/2016       Past Surgical History:   Procedure Laterality Date     SECTION      X's 3       Family History   Problem Relation Age of Onset    Diabetes Mother     High Blood Pressure Mother     Heart Disease Mother     Kidney Disease Mother     High Cholesterol Mother     Diabetes Father     High Cholesterol Father     High Blood

## 2024-02-15 NOTE — TELEPHONE ENCOUNTER
Following the patient's visit on 2/15/2024, Dr. Velazquez wishes to request the following for the patient:   MRI   Order in chart       Please advise clinical staff if further action is needed and/or when approval is received.

## 2024-03-04 ENCOUNTER — TELEPHONE (OUTPATIENT)
Dept: ORTHOPEDIC SURGERY | Age: 59
End: 2024-03-04

## 2024-03-04 NOTE — TELEPHONE ENCOUNTER
S/W Jayashree Peter  regarding MRI LT KNEE approval and authorization being valid until 3/31/2024.  Patient was instructed that their MRI has been scheduled  at [facility] :  3/5/2024 at 5:00pm with an arrival time of 4:30pm.     The patient was instructed to contact the facility if there is a need to reschedule at [phone]. The patient was provided contact information should the need arise to reschedule any of the appointments.     She states the above appointment will not work for her MRI. Jayashree will call to reschedule the MRI appointment and contact our office once she has done so.A follow up appointment will need to be scheduled to review the results and treatment plan.

## 2024-03-08 ENCOUNTER — HOSPITAL ENCOUNTER (OUTPATIENT)
Dept: MRI IMAGING | Age: 59
Discharge: HOME | End: 2024-03-08
Attending: ORTHOPAEDIC SURGERY
Payer: COMMERCIAL

## 2024-03-08 DIAGNOSIS — S80.02XA CONTUSION OF LEFT KNEE, INITIAL ENCOUNTER: ICD-10-CM

## 2024-03-08 PROCEDURE — 73721 MRI JNT OF LWR EXTRE W/O DYE: CPT

## 2024-03-14 ENCOUNTER — OFFICE VISIT (OUTPATIENT)
Dept: ORTHOPEDIC SURGERY | Age: 59
End: 2024-03-14

## 2024-03-14 ENCOUNTER — TELEPHONE (OUTPATIENT)
Dept: ORTHOPEDIC SURGERY | Age: 59
End: 2024-03-14

## 2024-03-14 VITALS — HEIGHT: 62 IN | WEIGHT: 197 LBS | RESPIRATION RATE: 16 BRPM | BODY MASS INDEX: 36.25 KG/M2

## 2024-03-14 DIAGNOSIS — S80.02XA CONTUSION OF LEFT KNEE, INITIAL ENCOUNTER: ICD-10-CM

## 2024-03-14 DIAGNOSIS — S83.242A ACUTE MEDIAL MENISCUS TEAR, LEFT, INITIAL ENCOUNTER: Primary | ICD-10-CM

## 2024-03-14 RX ORDER — MELOXICAM 15 MG/1
15 TABLET ORAL DAILY
Qty: 30 TABLET | Refills: 0 | Status: SHIPPED | OUTPATIENT
Start: 2024-03-14 | End: 2024-04-13

## 2024-03-14 NOTE — TELEPHONE ENCOUNTER
Please submit C9 request for     Physical Therapy - 1-2 times a week for 6 weeks (order in chart)  Additional Diagnosis - S83.242, medial meniscus tear left knee

## 2024-03-14 NOTE — PROGRESS NOTES
significant degenerative changes noted.     BONE MARROW: No evidence for occult fracture.  No suspicious focal bony  lesions.         Assessment:     Left knee medial meniscus tear   Class 1 obesity        Plan:     Discussed the MRI findings of medial meniscus tear. I did recommend surgery but she immediately declined stating she has had two C-sections. I tried to further explain what surgery would entail but she was not interested. I then tried to discuss nonoperative options such as cortisone injection and physical therapy. She again immediately denied injection. She is open to physical therapy. Discussed this will help with stability but may not resolve all of her pain due to location of the meniscus tear. She understood.     I did also offer meloxicam for control of pain and inflammation. She would like to proceed. Meloxicam sent to pharmacy. The patient was advised that NSAID-type medications have two very important potential side effects: gastrointestinal irritation including hemorrhage and renal injuries. She was asked to take the medication with food and to stop if she experiences any GI upset. I asked her to call for vomiting, abdominal pain or black/bloody stools. The patient expresses understanding of these issues and questions were answered.    We will request PT from [facility].     No squatting, pivoting or cutting at work for 2 months.     Follow up in 2 months.              GLADIS Negrete, PA-C  Board Certified by the National Committee on Certification of Physician Assistants  [facility]    Disclaimer:  This note was generated with use of a verbal recognition program and an attempt was made to check for errors.  It is possible that there are still dictated errors within this office note.  If so, please bring any significant errors to my attention for an addendum.  All efforts were made to ensure that this office note is accurate.

## 2024-04-03 ENCOUNTER — TELEPHONE (OUTPATIENT)
Dept: ORTHOPEDIC SURGERY | Age: 59
End: 2024-04-03

## 2024-04-03 NOTE — TELEPHONE ENCOUNTER
ISABELA/ELLE FOR Jayashree Peter   Physical therapy has been approved by [facility] with the following details:   12 total visits  Expires 5/17/2024  She may call [phone] to schedule at her convenience.

## 2024-04-04 NOTE — TELEPHONE ENCOUNTER
ISABELA/M FOR Jayashree Peter   Physical therapy has been approved by [facility] with the following details:   12 total visits  Expires 5/17/2024  She may call [phone] to schedule at her convenience.    [employer] message sent to Jayashree.

## 2024-04-14 DIAGNOSIS — S80.02XA CONTUSION OF LEFT KNEE, INITIAL ENCOUNTER: ICD-10-CM

## 2024-04-15 RX ORDER — MELOXICAM 15 MG/1
15 TABLET ORAL DAILY
Qty: 30 TABLET | Refills: 0 | Status: SHIPPED | OUTPATIENT
Start: 2024-04-15 | End: 2024-05-15

## 2024-04-17 ENCOUNTER — HOSPITAL ENCOUNTER (OUTPATIENT)
Dept: PHYSICAL THERAPY | Age: 59
Setting detail: THERAPIES SERIES
Discharge: HOME | End: 2024-04-17
Payer: COMMERCIAL

## 2024-04-17 DIAGNOSIS — M25.562 ACUTE PAIN OF LEFT KNEE: Primary | ICD-10-CM

## 2024-04-17 PROCEDURE — 97161 PT EVAL LOW COMPLEX 20 MIN: CPT

## 2024-04-17 PROCEDURE — 97530 THERAPEUTIC ACTIVITIES: CPT

## 2024-04-17 PROCEDURE — 97110 THERAPEUTIC EXERCISES: CPT

## 2024-04-17 NOTE — PLAN OF CARE
[facility] - Outpatient Rehabilitation and Therapy [address], [place] [address] office: [phone] fax: [phone]     Physical Therapy Initial Evaluation Certification      Dear Josette Obregon PA,    We had the pleasure of evaluating the following patient for physical therapy services at [facility].  A summary of our findings can be found in the initial assessment below.  This includes our plan of care.  If you have any questions or concerns regarding these findings, please do not hesitate to contact me at the office phone number listed above.  Thank you for the referral.     Physician Signature:_______________________________Date:__________________  By signing above (or electronic signature), therapist’s plan is approved by physician       Physical Therapy: TREATMENT/PROGRESS NOTE   Patient: Jayashree Peter (58 y.o. female)   Examination Date: 2024   :  1965 MRN: [id number]   Visit #: 1   Insurance Allowable Auth Needed   12 [x]Yes -   []No    Insurance: Payor: [employer] CMS / Plan: [employer] CMS / Product Type: *No Product type* /   Insurance ID: [id number] - (Worker's Comp)  : Chantelle WHITE FAX: [phone]  Secondary Insurance (if applicable):    Treatment Diagnosis: -    ICD-10-CM    1. Acute pain of left knee  M25.562          Medical Diagnosis:  Contusion of left knee, initial encounter [S80.02XA]   Referring Physician: Josette Obregon PA  PCP: Eugenia Cordero MD     Plan of care signed (Y/N):     Date of Patient follow up with Physician:      Progress Report/POC: EVAL today  POC update due: (10 visits /OR AUTH LIMITS, whichever is less) - 2024                                             Precautions/ Contra-indications:           Latex allergy:  NO  Pacemaker:    NO  Contraindications for Manipulation: NA  Date of Surgery: n/a  Other:    Red Flags:  None    C-SSRS Triggered by Intake questionnaire:

## 2024-04-19 ENCOUNTER — HOSPITAL ENCOUNTER (OUTPATIENT)
Dept: PHYSICAL THERAPY | Age: 59
Setting detail: THERAPIES SERIES
Discharge: HOME | End: 2024-04-19
Payer: COMMERCIAL

## 2024-04-19 PROCEDURE — 97112 NEUROMUSCULAR REEDUCATION: CPT

## 2024-04-19 PROCEDURE — 97110 THERAPEUTIC EXERCISES: CPT

## 2024-04-19 NOTE — FLOWSHEET NOTE
- 7 x weekly - 10 reps - 5 hold  - Supine Heel Slides  - 1 x daily - 7 x weekly - 10 reps  - Active Straight Leg Raise with Quad Set  - 1 x daily - 7 x weekly - 10 reps  - Seated Long Arc Quad  - 1 x daily - 7 x weekly - 10 reps    4/17/24 Pt was educated on PT POC, Diagnosis, Prognosis, pathomechanics as well as frequency and duration of scheduling future physical therapy appointments. Time was also taken on this day to answer all patient questions and participation in PT. 15 min      ASSESSMENT     Today's Assessment: Pt with fair tolerance of session today. Increased pain after last session and into today which did limit interventions today. Pt with relatively lower tolerance of interventions with increased anterior knee discomfort with interventions today limiting sets and reps. Pt with general discomfort anterior knee throughout session. Pt was unable to tolerate L LE WB with standing interventions due to increase in pain in L hip. By end of session pt does also report increased pain with R hip with standing interventions indicative of general deconditioning that will need to be addressed to improve tolerance for higher level activity.     Medical Necessity Documentation:  I certify that this patient meets the below criteria necessary for medical necessity for care and/or justification of therapy services:  The patient has functional impairments and/or activity limitations and would benefit from continued outpatient therapy services to address the deficits outlined in the patients goals  The patient has a musculoskeletal condition(s) with a corresponding ICD-10 code that is of complexity and severity that require skilled therapeutic intervention. This has a direct and significant impact on the need for therapy and significantly impacts the rate of recovery.     Return to Play: NA    Prognosis for POC: [x] Good [] Fair  [] Poor    Patient requires continued skilled intervention: [x] Yes  [] No      CHARGE

## 2024-04-23 ENCOUNTER — HOSPITAL ENCOUNTER (OUTPATIENT)
Dept: PHYSICAL THERAPY | Age: 59
Setting detail: THERAPIES SERIES
Discharge: HOME | End: 2024-04-23
Payer: COMMERCIAL

## 2024-04-23 PROCEDURE — 97110 THERAPEUTIC EXERCISES: CPT

## 2024-04-23 NOTE — FLOWSHEET NOTE
joint protection and postural re-education    Plan: Continue with quad and glute activation as able, consider interventions in pool.     Electronically Signed by John Alexander, PT, DPT, OCS, OMT-C  Date: 04/23/2024     Note: Portions of this note have been templated and/or copied from initial evaluation, reassessments and prior notes for documentation efficiency.    Note: If patient does not return for scheduled/recommended follow up visits, this note will serve as a discharge from care along with the most recent update on progress.

## 2024-04-25 ENCOUNTER — HOSPITAL ENCOUNTER (OUTPATIENT)
Dept: PHYSICAL THERAPY | Age: 59
Setting detail: THERAPIES SERIES
Discharge: HOME | End: 2024-04-25
Payer: COMMERCIAL

## 2024-04-25 PROCEDURE — 97110 THERAPEUTIC EXERCISES: CPT

## 2024-04-25 NOTE — FLOWSHEET NOTE
# Time In Time Out Total Time CPT Code (UNTIMED) #    Therex (95797)  3 3:00 3:41 41  EVAL:LOW (28453 - Typically 20 minutes face-to-face)     Neuromusc. Re-ed (53128)      Re-Eval (84589)     Manual (47308)      Estim Unattended (09780)     Ther. Act (09764)      Mech. Traction (78719)     Gait (94553)      Dry Needle 1-2 muscle (09238)     Aquatic Therex (90964)      Dry Needle 3+ muscle (68276)     Iontophoresis (13810)      VASO (93983)     Ultrasound (10541)      Group Therapy (74458)     Estim Attended (75388)      Canalith Repositioning (18995)     Other:      Other:    Totals   3 3:00 3:41 41       Total Treatment Minutes 41       Charge Justification:  (89743) THERAPEUTIC EXERCISE - Provided verbal/tactile cueing for activities related to strengthening, flexibility, endurance, ROM performed to prevent loss of range of motion, maintain or improve muscular strength or increase flexibility, following either an injury or surgery.       GOALS     Patient stated goal: decrease pain, improve strength needed for work activity and prevent the need for surgery   Status: [] Progressing: [] Met: [] Not Met: [] Adjusted    Therapist goals for Patient:   Short Term Goals: To be achieved in: 2 weeks  Independent in HEP and progression per patient tolerance, in order to progress toward full function and prevent re-injury.    Status: [] Progressing: [] Met: [] Not Met: [] Adjusted  Patient will have a decrease in pain to 2/10 to help facilitate improvement in movement, function, and ADLs as indicated by functional deficits.   Status: [] Progressing: [] Met: [] Not Met: [] Adjusted    Long Term Goals: To be achieved in: 8 weeks  Disability index score of 20% or less for the LEFS to assist with return top prior level of function.   Status: [] Progressing: [] Met: [] Not Met: [] Adjusted  Patient will demonstrate full ROM of L knee to 5 hyper ext to 130 degrees of knee flexion to improve the ability to get in/out of her

## 2024-04-30 ENCOUNTER — HOSPITAL ENCOUNTER (OUTPATIENT)
Dept: PHYSICAL THERAPY | Age: 59
Setting detail: THERAPIES SERIES
Discharge: HOME | End: 2024-04-30
Payer: COMMERCIAL

## 2024-04-30 PROCEDURE — 97530 THERAPEUTIC ACTIVITIES: CPT

## 2024-04-30 PROCEDURE — 97110 THERAPEUTIC EXERCISES: CPT

## 2024-04-30 NOTE — FLOWSHEET NOTE
0-5 0/10 currently 5+/10 with sharp jab   Functional questionnaire LEFS 28 / 65%    Other:                  OBJECTIVE EXAMINATION     4/30: Knee flexion in sitting with slide board 100 degrees.   4/25: knee flexion in sitting ~90 degrees.     4/17/24  ROM/Strength: (Blank cells denote NT) -     Mvmt (norm) AROM L AROM R Notes PROM L PROM R Notes               LUMBAR Flex (90)         Ext (25)         SB (25)          Rotation (30)                       HIP Flex (120)          Abd (45)          ER (50)          IR (45)          Ext (20)                   KNEE Flex (140) 55*  Pain limiting range       Ext (0) 5 of hyper ext                    ANKLE DF (20)          PF (50)          Inversion (30)          Eversion (20)             MMT L          MMT  R Notes     LUMBAR  Flexion       Extension       Lateral flexion        Rotation          MMT L MMT R Notes       HIP  Flexion 4 5      Abduction 4- 5      ER 4 5      IR 4- 5      Extension 4- 4           KNEE  Flexion 4 5      Extension 4- 5             ANKLE  DF        PF        Inversion        Eversion          Exercises/Interventions     Therapeutic Ex (50398)  resistance Sets/time Reps Notes/Cues/Progressions                        Rec bike   3 min    Seated heel slide with board  5\" 10 4/25   Incline board  30\" 2 4/19   Long sitting at EOB HSS  30\" 2 4/19   SAQ  1 15 4/19   3 way hip   1 10 R/l 4/19, increased L hip pain with L stance limiting; 4/25 hip abd R/L tolerated   Heel raises  1 15 4/19, ^4/25 reps   Standing HS curls on L  1 10 4/19   Side stepping length of plingth  LAQ  1 15 4/19, ^4/25 reps   Seated HS curls blue 1 15 4/30                               Manual Intervention (14081)  TIME                                        NMR re-education (27666) resistance Sets/time Reps CUES NEEDED   Quad set TKE on L                      Therapeutic Activity (69695)  Sets/time     Sit to stand from chair Airex in seat 1 5 4/30   Forward step ups 4 inch 1 10 L

## 2024-05-03 ENCOUNTER — HOSPITAL ENCOUNTER (OUTPATIENT)
Dept: PHYSICAL THERAPY | Age: 59
Setting detail: THERAPIES SERIES
Discharge: HOME | End: 2024-05-03
Payer: COMMERCIAL

## 2024-05-03 PROCEDURE — 97110 THERAPEUTIC EXERCISES: CPT

## 2024-05-03 PROCEDURE — 97530 THERAPEUTIC ACTIVITIES: CPT

## 2024-05-03 NOTE — FLOWSHEET NOTE
[facility] - Outpatient Rehabilitation and Therapy [address], [place] [address] office: [phone] fax: [phone]       Physical Therapy: TREATMENT/PROGRESS NOTE   Patient: Jayashree Peter (58 y.o. female)   Examination Date: 2024   :  1965 MRN: [id number]   Visit #: 6   Insurance Allowable Auth Needed   12 [x]Yes -   []No    Insurance: Payor: [employer] CMS / Plan: [employer] CMS / Product Type: *No Product type* /   Insurance ID: [id number] - (Worker's Comp)  : Chantelle WHITE FAX: [phone]  Secondary Insurance (if applicable):    Treatment Diagnosis: -    ICD-10-CM    1. Acute pain of left knee  M25.562          Medical Diagnosis:  Contusion of left knee, initial encounter [S80.02XA]   Referring Physician: Josette Obregon PA  PCP: Eugenia Cordero MD     Plan of care signed (Y/N):     Date of Patient follow up with Physician:      Progress Report/POC: NO  POC update due: (10 visits /OR AUTH LIMITS, whichever is less) - 2024                                             Precautions/ Contra-indications:           Latex allergy:  NO  Pacemaker:    NO  Contraindications for Manipulation: NA  Date of Surgery: n/a  Other:    Red Flags:  None    C-SSRS Triggered by Intake questionnaire:   [x] No, Questionnaire did not trigger screening.   [] Yes, Patient intake triggered further evaluation      [] C-SSRS Screening completed  [] PCP notified via Plan of Care  [] Emergency services notified     Preferred Language for Healthcare:   [x] English       [] other:    SUBJECTIVE EXAMINATION     Patient stated complaint: Pt reports she is doing OK, overall about the same. Pain currently is not there but certain activity will cause an increase in pain. Pain can be on the inside of her knee, front or outside. Does not seem to be consistent with pain presentation.        Test used Initial score  2024   Pain Summary VAS 0-5 0/10 currently 5+/10

## 2024-05-08 ENCOUNTER — HOSPITAL ENCOUNTER (OUTPATIENT)
Dept: PHYSICAL THERAPY | Age: 59
Setting detail: THERAPIES SERIES
Discharge: HOME | End: 2024-05-08
Payer: COMMERCIAL

## 2024-05-08 PROCEDURE — 97110 THERAPEUTIC EXERCISES: CPT

## 2024-05-08 PROCEDURE — 97530 THERAPEUTIC ACTIVITIES: CPT

## 2024-05-08 NOTE — FLOWSHEET NOTE
[facility] - Outpatient Rehabilitation and Therapy [address], [place] [address] office: [phone] fax: [phone]       Physical Therapy: TREATMENT/PROGRESS NOTE   Patient: Jayashree Peter (58 y.o. female)   Examination Date: 2024   :  1965 MRN: [id number]   Visit #: 7   Insurance Allowable Auth Needed   12 [x]Yes -   []No    Insurance: Payor: [employer] CMS / Plan: [employer] CMS / Product Type: *No Product type* /   Insurance ID: [id number] - (Worker's Comp)  : Chantelle WHITE FAX: [phone]  Secondary Insurance (if applicable):    Treatment Diagnosis: -    ICD-10-CM    1. Acute pain of left knee  M25.562          Medical Diagnosis:  Contusion of left knee, initial encounter [S80.02XA]   Referring Physician: Josette Obregon PA  PCP: Eugenia Cordero MD     Plan of care signed (Y/N):     Date of Patient follow up with Physician:      Progress Report/POC: NO  POC update due: (10 visits /OR AUTH LIMITS, whichever is less) - 2024                                             Precautions/ Contra-indications:           Latex allergy:  NO  Pacemaker:    NO  Contraindications for Manipulation: NA  Date of Surgery: n/a  Other:    Red Flags:  None    C-SSRS Triggered by Intake questionnaire:   [x] No, Questionnaire did not trigger screening.   [] Yes, Patient intake triggered further evaluation      [] C-SSRS Screening completed  [] PCP notified via Plan of Care  [] Emergency services notified     Preferred Language for Healthcare:   [x] English       [] other:    SUBJECTIVE EXAMINATION     Patient stated complaint:  Patient reports that the knee is feeling about the same and that there times where she stills feel the sharp pain in her knee. Patient reports that her knee did not feel too bad at work, and that she thinks sitting down every hour helped out.       Test used Initial score  2024   Pain Summary VAS 0-5 3/10 currently

## 2024-05-10 ENCOUNTER — HOSPITAL ENCOUNTER (OUTPATIENT)
Dept: PHYSICAL THERAPY | Age: 59
Setting detail: THERAPIES SERIES
Discharge: HOME | End: 2024-05-10
Payer: COMMERCIAL

## 2024-05-10 PROCEDURE — 97110 THERAPEUTIC EXERCISES: CPT | Performed by: SPECIALIST/TECHNOLOGIST

## 2024-05-10 PROCEDURE — 97140 MANUAL THERAPY 1/> REGIONS: CPT | Performed by: SPECIALIST/TECHNOLOGIST

## 2024-05-10 PROCEDURE — 97112 NEUROMUSCULAR REEDUCATION: CPT | Performed by: SPECIALIST/TECHNOLOGIST

## 2024-05-10 NOTE — FLOWSHEET NOTE
including: functional mobility training and education.  Neuromuscular Re-education (37348) activation and proprioception, including postural re-education.    Gait Training (77094) for normalization of ambulation patterns and AD training.   Manual Therapy (59988) as indicated to include: Passive Range of Motion, Gr I-IV mobilizations, Soft Tissue Mobilization, and Trigger Point Release  Modalities as needed that may include: Cryotherapy and Vasoneumatic Compression  Patient education on joint protection and postural re-education    Plan: Continue to work on quad engagement and general anterior knee loading as able.     Electronically Signed by Crystal Pool PTA, [id number]  Date: 05/10/2024     Note: Portions of this note have been templated and/or copied from initial evaluation, reassessments and prior notes for documentation efficiency.    Note: If patient does not return for scheduled/recommended follow up visits, this note will serve as a discharge from care along with the most recent update on progress.

## 2024-05-12 DIAGNOSIS — S80.02XA CONTUSION OF LEFT KNEE, INITIAL ENCOUNTER: ICD-10-CM

## 2024-05-13 RX ORDER — MELOXICAM 15 MG/1
15 TABLET ORAL DAILY
Qty: 30 TABLET | Refills: 0 | Status: SHIPPED | OUTPATIENT
Start: 2024-05-13 | End: 2024-06-12

## 2024-05-29 ENCOUNTER — TELEPHONE (OUTPATIENT)
Dept: ORTHOPEDIC SURGERY | Age: 59
End: 2024-05-29

## 2024-05-29 NOTE — TELEPHONE ENCOUNTER
S/W VELIA to inform her request is available for p/u at [facility] . Requested she bring a photo ID for p/u. Patient voiced understanding of the conversation and will contact the office with further questions or concerns.

## 2024-06-13 DIAGNOSIS — S80.02XA CONTUSION OF LEFT KNEE, INITIAL ENCOUNTER: ICD-10-CM

## 2024-06-13 RX ORDER — MELOXICAM 15 MG/1
15 TABLET ORAL DAILY
Qty: 30 TABLET | Refills: 0 | Status: SHIPPED | OUTPATIENT
Start: 2024-06-13 | End: 2024-07-13

## 2024-07-21 DIAGNOSIS — S80.02XA CONTUSION OF LEFT KNEE, INITIAL ENCOUNTER: ICD-10-CM

## 2024-07-22 RX ORDER — MELOXICAM 15 MG/1
15 TABLET ORAL DAILY
Qty: 30 TABLET | Refills: 0 | OUTPATIENT
Start: 2024-07-22 | End: 2024-08-21

## 2024-08-01 ENCOUNTER — OFFICE VISIT (OUTPATIENT)
Dept: FAMILY MEDICINE CLINIC | Age: 59
End: 2024-08-01

## 2024-08-01 VITALS
TEMPERATURE: 97.2 F | HEART RATE: 68 BPM | HEIGHT: 62 IN | BODY MASS INDEX: 36.25 KG/M2 | RESPIRATION RATE: 16 BRPM | SYSTOLIC BLOOD PRESSURE: 132 MMHG | DIASTOLIC BLOOD PRESSURE: 88 MMHG | OXYGEN SATURATION: 99 % | WEIGHT: 197 LBS

## 2024-08-01 DIAGNOSIS — Z12.31 ENCOUNTER FOR SCREENING MAMMOGRAM FOR MALIGNANT NEOPLASM OF BREAST: ICD-10-CM

## 2024-08-01 DIAGNOSIS — Z00.00 ANNUAL PHYSICAL EXAM: Primary | ICD-10-CM

## 2024-08-01 DIAGNOSIS — E78.2 MIXED HYPERLIPIDEMIA: ICD-10-CM

## 2024-08-01 DIAGNOSIS — E66.01 CLASS 2 SEVERE OBESITY DUE TO EXCESS CALORIES WITH SERIOUS COMORBIDITY AND BODY MASS INDEX (BMI) OF 36.0 TO 36.9 IN ADULT (HCC): ICD-10-CM

## 2024-08-01 DIAGNOSIS — R73.03 PREDIABETES: ICD-10-CM

## 2024-08-01 PROBLEM — M25.532 BILATERAL WRIST PAIN: Status: RESOLVED | Noted: 2022-08-09 | Resolved: 2024-08-01

## 2024-08-01 PROBLEM — R20.0 FINGER NUMBNESS: Status: RESOLVED | Noted: 2022-08-09 | Resolved: 2024-08-01

## 2024-08-01 PROBLEM — G56.03 BILATERAL CARPAL TUNNEL SYNDROME: Status: RESOLVED | Noted: 2022-08-09 | Resolved: 2024-08-01

## 2024-08-01 PROBLEM — E66.812 CLASS 2 SEVERE OBESITY DUE TO EXCESS CALORIES WITH SERIOUS COMORBIDITY AND BODY MASS INDEX (BMI) OF 36.0 TO 36.9 IN ADULT: Status: ACTIVE | Noted: 2024-08-01

## 2024-08-01 PROBLEM — M25.531 BILATERAL WRIST PAIN: Status: RESOLVED | Noted: 2022-08-09 | Resolved: 2024-08-01

## 2024-08-01 LAB
25(OH)D3 SERPL-MCNC: 23.3 NG/ML
ALBUMIN SERPL-MCNC: 4.8 G/DL (ref 3.4–5)
ALBUMIN/GLOB SERPL: 1.9 {RATIO} (ref 1.1–2.2)
ALP SERPL-CCNC: 75 U/L (ref 40–129)
ALT SERPL-CCNC: 17 U/L (ref 10–40)
ANION GAP SERPL CALCULATED.3IONS-SCNC: 12 MMOL/L (ref 3–16)
AST SERPL-CCNC: 17 U/L (ref 15–37)
BASOPHILS # BLD: 0 K/UL (ref 0–0.2)
BASOPHILS NFR BLD: 0.8 %
BILIRUB SERPL-MCNC: 0.5 MG/DL (ref 0–1)
BILIRUBIN, POC: NORMAL
BLOOD URINE, POC: NORMAL
BUN SERPL-MCNC: 10 MG/DL (ref 7–20)
CALCIUM SERPL-MCNC: 10 MG/DL (ref 8.3–10.6)
CHLORIDE SERPL-SCNC: 101 MMOL/L (ref 99–110)
CHOLEST SERPL-MCNC: 274 MG/DL (ref 0–199)
CLARITY, POC: CLEAR
CO2 SERPL-SCNC: 25 MMOL/L (ref 21–32)
COLOR, POC: NORMAL
CREAT SERPL-MCNC: 0.9 MG/DL (ref 0.6–1.2)
CREATININE URINE POCT: 10
DEPRECATED RDW RBC AUTO: 13.2 % (ref 12.4–15.4)
EOSINOPHIL # BLD: 0.2 K/UL (ref 0–0.6)
EOSINOPHIL NFR BLD: 3.7 %
EST. AVERAGE GLUCOSE BLD GHB EST-MCNC: 114 MG/DL
GFR SERPLBLD CREATININE-BSD FMLA CKD-EPI: 73 ML/MIN/{1.73_M2}
GLUCOSE P FAST SERPL-MCNC: 96 MG/DL (ref 70–99)
GLUCOSE URINE, POC: NORMAL
HBA1C MFR BLD: 5.6 %
HCT VFR BLD AUTO: 40.3 % (ref 36–48)
HCV AB SERPL QL IA: NORMAL
HDLC SERPL-MCNC: 65 MG/DL (ref 40–60)
HGB BLD-MCNC: 13.6 G/DL (ref 12–16)
KETONES, POC: NORMAL
LDL CHOLESTEROL: 180 MG/DL
LEUKOCYTE EST, POC: NORMAL
LYMPHOCYTES # BLD: 2 K/UL (ref 1–5.1)
LYMPHOCYTES NFR BLD: 48.6 %
MCH RBC QN AUTO: 29.7 PG (ref 26–34)
MCHC RBC AUTO-ENTMCNC: 33.6 G/DL (ref 31–36)
MCV RBC AUTO: 88.3 FL (ref 80–100)
MICROALBUMIN/CREAT 24H UR: 10 MG/DL
MICROALBUMIN/CREAT UR-RTO: NORMAL MG/G
MONOCYTES # BLD: 0.4 K/UL (ref 0–1.3)
MONOCYTES NFR BLD: 10.6 %
NEUTROPHILS # BLD: 1.5 K/UL (ref 1.7–7.7)
NEUTROPHILS NFR BLD: 36.3 %
NITRITE, POC: NORMAL
PH, POC: 6
PLATELET # BLD AUTO: 241 K/UL (ref 135–450)
PMV BLD AUTO: 9.3 FL (ref 5–10.5)
POTASSIUM SERPL-SCNC: 4.1 MMOL/L (ref 3.5–5.1)
PROT SERPL-MCNC: 7.3 G/DL (ref 6.4–8.2)
PROTEIN, POC: NORMAL
RBC # BLD AUTO: 4.57 M/UL (ref 4–5.2)
SODIUM SERPL-SCNC: 138 MMOL/L (ref 136–145)
SPECIFIC GRAVITY, POC: 1.01
TRIGL SERPL-MCNC: 145 MG/DL (ref 0–150)
TSH SERPL DL<=0.005 MIU/L-ACNC: 1.32 UIU/ML (ref 0.27–4.2)
UROBILINOGEN, POC: 0.2
VLDLC SERPL CALC-MCNC: 29 MG/DL
WBC # BLD AUTO: 4.2 K/UL (ref 4–11)

## 2024-08-01 RX ORDER — GABAPENTIN 100 MG/1
100 CAPSULE ORAL 3 TIMES DAILY
COMMUNITY
Start: 2024-07-14

## 2024-08-01 RX ORDER — METHOCARBAMOL 500 MG/1
500 TABLET, FILM COATED ORAL DAILY PRN
COMMUNITY
Start: 2024-07-02

## 2024-08-01 SDOH — ECONOMIC STABILITY: HOUSING INSECURITY
IN THE LAST 12 MONTHS, WAS THERE A TIME WHEN YOU DID NOT HAVE A STEADY PLACE TO SLEEP OR SLEPT IN A SHELTER (INCLUDING NOW)?: NO

## 2024-08-01 SDOH — ECONOMIC STABILITY: FOOD INSECURITY: WITHIN THE PAST 12 MONTHS, YOU WORRIED THAT YOUR FOOD WOULD RUN OUT BEFORE YOU GOT MONEY TO BUY MORE.: NEVER TRUE

## 2024-08-01 SDOH — ECONOMIC STABILITY: FOOD INSECURITY: WITHIN THE PAST 12 MONTHS, THE FOOD YOU BOUGHT JUST DIDN'T LAST AND YOU DIDN'T HAVE MONEY TO GET MORE.: NEVER TRUE

## 2024-08-01 SDOH — ECONOMIC STABILITY: INCOME INSECURITY: HOW HARD IS IT FOR YOU TO PAY FOR THE VERY BASICS LIKE FOOD, HOUSING, MEDICAL CARE, AND HEATING?: SOMEWHAT HARD

## 2024-08-01 ASSESSMENT — PATIENT HEALTH QUESTIONNAIRE - PHQ9
SUM OF ALL RESPONSES TO PHQ QUESTIONS 1-9: 0
2. FEELING DOWN, DEPRESSED OR HOPELESS: NOT AT ALL
SUM OF ALL RESPONSES TO PHQ QUESTIONS 1-9: 0
1. LITTLE INTEREST OR PLEASURE IN DOING THINGS: NOT AT ALL
SUM OF ALL RESPONSES TO PHQ9 QUESTIONS 1 & 2: 0

## 2024-08-01 ASSESSMENT — ENCOUNTER SYMPTOMS
DIARRHEA: 0
VOMITING: 0
SINUS PRESSURE: 0
WHEEZING: 0
SINUS PAIN: 0
BLOOD IN STOOL: 0
EYES NEGATIVE: 1
BACK PAIN: 1
NAUSEA: 0
SORE THROAT: 0
CONSTIPATION: 0
SHORTNESS OF BREATH: 0
ABDOMINAL PAIN: 0
COUGH: 0
CHEST TIGHTNESS: 0

## 2024-08-01 NOTE — PATIENT INSTRUCTIONS
Consider the Shingles vaccine  A few other vaccines to consider:  RSV, Covid Booster, RSV vaccine and Flu Vaccine    Call to schedule mammogram at [phone]    Call to schedule your pap with your OB/GYN

## 2024-08-01 NOTE — PROGRESS NOTES
Mood and Affect: Mood normal.         Speech: Speech normal.         Behavior: Behavior normal. Behavior is cooperative.         Thought Content: Thought content normal.         Judgment: Judgment normal.     ASSESSMENT/PLAN:  1. Annual physical exam    2. Class 2 severe obesity due to excess calories with serious comorbidity and body mass index (BMI) of 36.0 to 36.9 in adult (HCC)  Unstable  Recommend lower carb/higher protein diet, at least 65 grams protein daily  Increase water intake, at least 64 ounces water daily  Recommended at minimum of 30 minutes of extra walking daily    3. Prediabetes  Recommend lower carb/higher protein diet, at least 65 grams protein daily  Increase water intake, at least 64 ounces water daily  Recommended at minimum of 30 minutes of extra walking daily  Labs are pending  - Hemoglobin A1C  - Comprehensive Metabolic Panel, Fasting  - CBC with Auto Differential  - Lipid, Fasting  - TSH with Reflex to FT4  - Vitamin D 25 Hydroxy  - HIV Screen  - Hepatitis C Antibody  - POCT Urinalysis no Micro  - POCT microalbumin  - Culture, Urine    4. Mixed hyperlipidemia  Recommend lower fat diet  Recommended at minimum of 30 minutes of extra walking daily  Labs are pending  - Hemoglobin A1C  - Comprehensive Metabolic Panel, Fasting  - CBC with Auto Differential  - Lipid, Fasting  - TSH with Reflex to FT4  - Vitamin D 25 Hydroxy  - HIV Screen  - Hepatitis C Antibody    5. Encounter for screening mammogram for malignant neoplasm of breast  Call to schedule  - [facility] GARRY DIGITAL SCREEN BILATERAL; Future    Return in about 6 months (around 2/1/2025), or if symptoms worsen or fail to improve, for prediabetes.

## 2024-08-02 PROBLEM — E55.9 VITAMIN D DEFICIENCY: Status: ACTIVE | Noted: 2024-08-02

## 2024-08-02 LAB
BACTERIA UR CULT: NORMAL
HIV 1+2 AB+HIV1 P24 AG SERPL QL IA: NORMAL
HIV 2 AB SERPL QL IA: NORMAL
HIV1 AB SERPL QL IA: NORMAL
HIV1 P24 AG SERPL QL IA: NORMAL

## 2025-01-24 ENCOUNTER — TELEPHONE (OUTPATIENT)
Dept: FAMILY MEDICINE CLINIC | Age: 60
End: 2025-01-24

## 2025-01-24 NOTE — TELEPHONE ENCOUNTER
Pt called and said that [employer] faxed over [facility] paperwork for raj to sign, but there are no forms in the system. Pt said [employer]'s phone # is [phone]. Pt is aware that raj is out today and next week.    Please call pt with update

## 2025-02-15 ENCOUNTER — OFFICE VISIT (OUTPATIENT)
Age: 60
End: 2025-02-15

## 2025-02-15 VITALS
HEIGHT: 62 IN | WEIGHT: 182 LBS | HEART RATE: 76 BPM | BODY MASS INDEX: 33.49 KG/M2 | TEMPERATURE: 98.2 F | OXYGEN SATURATION: 97 % | SYSTOLIC BLOOD PRESSURE: 117 MMHG | DIASTOLIC BLOOD PRESSURE: 81 MMHG

## 2025-02-15 DIAGNOSIS — R05.1 ACUTE COUGH: Primary | ICD-10-CM

## 2025-02-15 DIAGNOSIS — R09.89 CHEST CONGESTION: ICD-10-CM

## 2025-02-15 RX ORDER — METHYLPREDNISOLONE 4 MG/1
TABLET ORAL
Qty: 1 KIT | Refills: 0 | Status: SHIPPED | OUTPATIENT
Start: 2025-02-15 | End: 2025-02-21

## 2025-02-15 RX ORDER — BROMPHENIRAMINE MALEATE, PSEUDOEPHEDRINE HYDROCHLORIDE, AND DEXTROMETHORPHAN HYDROBROMIDE 2; 30; 10 MG/5ML; MG/5ML; MG/5ML
5 SYRUP ORAL 4 TIMES DAILY PRN
Qty: 118 ML | Refills: 0 | Status: SHIPPED | OUTPATIENT
Start: 2025-02-15

## 2025-02-15 ASSESSMENT — ENCOUNTER SYMPTOMS: COUGH: 1

## 2025-02-15 NOTE — PROGRESS NOTES
Jayashree Peter (:  1965) is a 59 y.o. female,New patient, here for evaluation of the following chief complaint(s):  Cough (Cough and nasal drainage x 4 days )      ASSESSMENT/PLAN:    ICD-10-CM    1. Acute cough  R05.1 methylPREDNISolone (MEDROL DOSEPACK) 4 MG tablet      2. Chest congestion  R09.89 methylPREDNISolone (MEDROL DOSEPACK) 4 MG tablet          Discussed viral infection vs bacterial infection.  Symptoms today appear viral. Treat with OTC medications as discussed.  Patient V/U.    Follow up in 3-5 days if symptoms persist or if symptoms worsen.    SUBJECTIVE/OBJECTIVE:  HPI  HPI:   59 y.o. female presents with symptoms of nasal congestion. cough ongoing since 4 days. Denies sore throat. Has taken tylenol for symptoms no relief.    Vitals:    02/15/25 0842   BP: 117/81   Site: Left Upper Arm   Position: Sitting   Cuff Size: Large Adult   Pulse: 76   Temp: 98.2 °F (36.8 °C)   TempSrc: Oral   SpO2: 97%   Weight: 82.6 kg (182 lb)   Height: 1.575 m (5' 2\")       Review of Systems   HENT:  Positive for congestion.    Respiratory:  Positive for cough.    All other systems reviewed and are negative.      Physical Exam  Vitals and nursing note reviewed.   Constitutional:       Appearance: Normal appearance.   HENT:      Head: Normocephalic and atraumatic.      Right Ear: Tympanic membrane normal.      Left Ear: Tympanic membrane normal.      Nose: Congestion and rhinorrhea present.      Mouth/Throat:      Pharynx: No posterior oropharyngeal erythema.   Eyes:      Extraocular Movements: Extraocular movements intact.      Pupils: Pupils are equal, round, and reactive to light.   Cardiovascular:      Rate and Rhythm: Normal rate and regular rhythm.   Pulmonary:      Effort: Pulmonary effort is normal.      Breath sounds: Normal breath sounds.   Abdominal:      General: Abdomen is flat. Bowel sounds are normal.      Palpations: Abdomen is soft.   Musculoskeletal:         General: No swelling or tenderness.

## 2025-05-17 ENCOUNTER — HOSPITAL ENCOUNTER (OUTPATIENT)
Dept: WOMENS IMAGING | Age: 60
Discharge: HOME | End: 2025-05-17

## 2025-05-17 VITALS — BODY MASS INDEX: 34.04 KG/M2 | WEIGHT: 185 LBS | HEIGHT: 62 IN

## 2025-05-17 DIAGNOSIS — Z12.31 ENCOUNTER FOR SCREENING MAMMOGRAM FOR MALIGNANT NEOPLASM OF BREAST: ICD-10-CM

## 2025-05-17 PROCEDURE — 77063 BREAST TOMOSYNTHESIS BI: CPT

## 2025-05-19 ENCOUNTER — RESULTS FOLLOW-UP (OUTPATIENT)
Age: 60
End: 2025-05-19

## 2025-07-29 ASSESSMENT — PATIENT HEALTH QUESTIONNAIRE - PHQ9
2. FEELING DOWN, DEPRESSED OR HOPELESS: NOT AT ALL
2. FEELING DOWN, DEPRESSED OR HOPELESS: NOT AT ALL
SUM OF ALL RESPONSES TO PHQ QUESTIONS 1-9: 0
1. LITTLE INTEREST OR PLEASURE IN DOING THINGS: NOT AT ALL
SUM OF ALL RESPONSES TO PHQ9 QUESTIONS 1 & 2: 0
1. LITTLE INTEREST OR PLEASURE IN DOING THINGS: NOT AT ALL

## 2025-08-01 ENCOUNTER — OFFICE VISIT (OUTPATIENT)
Dept: FAMILY MEDICINE CLINIC | Age: 60
End: 2025-08-01
Payer: COMMERCIAL

## 2025-08-01 VITALS
WEIGHT: 181.4 LBS | SYSTOLIC BLOOD PRESSURE: 118 MMHG | BODY MASS INDEX: 33.38 KG/M2 | DIASTOLIC BLOOD PRESSURE: 72 MMHG | HEART RATE: 70 BPM | HEIGHT: 62 IN | OXYGEN SATURATION: 99 %

## 2025-08-01 DIAGNOSIS — E66.812 CLASS 2 SEVERE OBESITY DUE TO EXCESS CALORIES WITH SERIOUS COMORBIDITY AND BODY MASS INDEX (BMI) OF 36.0 TO 36.9 IN ADULT (HCC): ICD-10-CM

## 2025-08-01 DIAGNOSIS — E66.811 CLASS 1 OBESITY DUE TO EXCESS CALORIES WITH SERIOUS COMORBIDITY AND BODY MASS INDEX (BMI) OF 33.0 TO 33.9 IN ADULT: ICD-10-CM

## 2025-08-01 DIAGNOSIS — E55.9 VITAMIN D DEFICIENCY: ICD-10-CM

## 2025-08-01 DIAGNOSIS — E78.2 MIXED HYPERLIPIDEMIA: ICD-10-CM

## 2025-08-01 DIAGNOSIS — E66.09 CLASS 1 OBESITY DUE TO EXCESS CALORIES WITH SERIOUS COMORBIDITY AND BODY MASS INDEX (BMI) OF 33.0 TO 33.9 IN ADULT: ICD-10-CM

## 2025-08-01 DIAGNOSIS — E66.01 CLASS 2 SEVERE OBESITY DUE TO EXCESS CALORIES WITH SERIOUS COMORBIDITY AND BODY MASS INDEX (BMI) OF 36.0 TO 36.9 IN ADULT (HCC): ICD-10-CM

## 2025-08-01 DIAGNOSIS — R73.03 PREDIABETES: ICD-10-CM

## 2025-08-01 DIAGNOSIS — Z00.00 ANNUAL PHYSICAL EXAM: Primary | ICD-10-CM

## 2025-08-01 DIAGNOSIS — Z12.31 ENCOUNTER FOR SCREENING MAMMOGRAM FOR MALIGNANT NEOPLASM OF BREAST: ICD-10-CM

## 2025-08-01 PROBLEM — M79.641 BILATERAL HAND PAIN: Status: RESOLVED | Noted: 2022-08-09 | Resolved: 2025-08-01

## 2025-08-01 PROBLEM — G56.03 BILATERAL CARPAL TUNNEL SYNDROME: Status: RESOLVED | Noted: 2022-08-09 | Resolved: 2025-08-01

## 2025-08-01 PROBLEM — M79.642 BILATERAL HAND PAIN: Status: RESOLVED | Noted: 2022-08-09 | Resolved: 2025-08-01

## 2025-08-01 LAB
25(OH)D3 SERPL-MCNC: 38.4 NG/ML
ALBUMIN SERPL-MCNC: 4.7 G/DL (ref 3.4–5)
ALBUMIN/GLOB SERPL: 2.1 {RATIO} (ref 1.1–2.2)
ALP SERPL-CCNC: 65 U/L (ref 40–129)
ALT SERPL-CCNC: 21 U/L (ref 10–40)
ANION GAP SERPL CALCULATED.3IONS-SCNC: 11 MMOL/L (ref 3–16)
AST SERPL-CCNC: 23 U/L (ref 15–37)
BASOPHILS # BLD: 0 K/UL (ref 0–0.2)
BASOPHILS NFR BLD: 0.9 %
BILIRUB SERPL-MCNC: 0.5 MG/DL (ref 0–1)
BUN SERPL-MCNC: 9 MG/DL (ref 7–20)
CALCIUM SERPL-MCNC: 10.1 MG/DL (ref 8.3–10.6)
CHLORIDE SERPL-SCNC: 104 MMOL/L (ref 99–110)
CHOLEST SERPL-MCNC: 266 MG/DL (ref 0–199)
CO2 SERPL-SCNC: 26 MMOL/L (ref 21–32)
CREAT SERPL-MCNC: 0.9 MG/DL (ref 0.6–1.2)
DEPRECATED RDW RBC AUTO: 13.2 % (ref 12.4–15.4)
EOSINOPHIL # BLD: 0.1 K/UL (ref 0–0.6)
EOSINOPHIL NFR BLD: 2.8 %
GFR SERPLBLD CREATININE-BSD FMLA CKD-EPI: 72 ML/MIN/{1.73_M2}
GLUCOSE P FAST SERPL-MCNC: 83 MG/DL (ref 70–99)
HCT VFR BLD AUTO: 40.3 % (ref 36–48)
HDLC SERPL-MCNC: 78 MG/DL (ref 40–60)
HGB BLD-MCNC: 13.8 G/DL (ref 12–16)
LDL CHOLESTEROL: 165 MG/DL
LYMPHOCYTES # BLD: 1.8 K/UL (ref 1–5.1)
LYMPHOCYTES NFR BLD: 42.9 %
MCH RBC QN AUTO: 29.7 PG (ref 26–34)
MCHC RBC AUTO-ENTMCNC: 34.2 G/DL (ref 31–36)
MCV RBC AUTO: 86.8 FL (ref 80–100)
MONOCYTES # BLD: 0.4 K/UL (ref 0–1.3)
MONOCYTES NFR BLD: 9.8 %
NEUTROPHILS # BLD: 1.8 K/UL (ref 1.7–7.7)
NEUTROPHILS NFR BLD: 43.6 %
PLATELET # BLD AUTO: 227 K/UL (ref 135–450)
PMV BLD AUTO: 9.1 FL (ref 5–10.5)
POTASSIUM SERPL-SCNC: 4.3 MMOL/L (ref 3.5–5.1)
PROT SERPL-MCNC: 6.9 G/DL (ref 6.4–8.2)
RBC # BLD AUTO: 4.65 M/UL (ref 4–5.2)
SODIUM SERPL-SCNC: 141 MMOL/L (ref 136–145)
TRIGL SERPL-MCNC: 113 MG/DL (ref 0–150)
TSH SERPL DL<=0.005 MIU/L-ACNC: 1.49 UIU/ML (ref 0.27–4.2)
VLDLC SERPL CALC-MCNC: 23 MG/DL
WBC # BLD AUTO: 4.2 K/UL (ref 4–11)

## 2025-08-01 PROCEDURE — 99396 PREV VISIT EST AGE 40-64: CPT | Performed by: NURSE PRACTITIONER

## 2025-08-01 SDOH — ECONOMIC STABILITY: FOOD INSECURITY: WITHIN THE PAST 12 MONTHS, THE FOOD YOU BOUGHT JUST DIDN'T LAST AND YOU DIDN'T HAVE MONEY TO GET MORE.: NEVER TRUE

## 2025-08-01 SDOH — ECONOMIC STABILITY: FOOD INSECURITY: WITHIN THE PAST 12 MONTHS, YOU WORRIED THAT YOUR FOOD WOULD RUN OUT BEFORE YOU GOT MONEY TO BUY MORE.: NEVER TRUE

## 2025-08-01 ASSESSMENT — ENCOUNTER SYMPTOMS
SHORTNESS OF BREATH: 0
CONSTIPATION: 0
DIARRHEA: 0
SINUS PAIN: 0
BLOOD IN STOOL: 0
WHEEZING: 0
SINUS PRESSURE: 0
COUGH: 0
NAUSEA: 0
ABDOMINAL PAIN: 0
EYES NEGATIVE: 1
VOMITING: 0
CHEST TIGHTNESS: 0
SORE THROAT: 0

## 2025-08-01 NOTE — PROGRESS NOTES
with serious comorbidity and body mass index (BMI) of 33.0 to 33.9 in adult  Unstable but improving  Recommend lower carb/higher protein diet, at least 65 grams protein daily  Increase water intake, at least 64 ounces water daily  Recommended at minimum of 30 minutes of extra walking daily  - Comprehensive Metabolic Panel, Fasting; Future  - CBC with Auto Differential; Future    3. Mixed hyperlipidemia  Recommend lifestyle change to lower cholesterol:  Weight loss, aerobic exercise 30 minutes daily, and start low fat diet. More fruits/vegetables, baked fish, chicken and turkey, limit red meat, fast food and carbs.  - Lipid, Fasting; Future    4. Prediabetes  - Hemoglobin A1C; Future    5. Vitamin D deficiency  - TSH reflex to FT4, FT3; Future  - Vitamin D 25 Hydroxy; Future    6. Encounter for screening mammogram for malignant neoplasm of breast  - [facility] GARRY DIGITAL SCREEN BILATERAL; Future    Return in about 1 year (around 8/1/2026), or if symptoms worsen or fail to improve.

## 2025-08-02 LAB
EST. AVERAGE GLUCOSE BLD GHB EST-MCNC: 114 MG/DL
HBA1C MFR BLD: 5.6 %